# Patient Record
Sex: MALE | Race: WHITE | NOT HISPANIC OR LATINO | URBAN - METROPOLITAN AREA
[De-identification: names, ages, dates, MRNs, and addresses within clinical notes are randomized per-mention and may not be internally consistent; named-entity substitution may affect disease eponyms.]

---

## 2019-01-28 ENCOUNTER — OFFICE VISIT (OUTPATIENT)
Dept: DERMATOLOGY | Age: 59
End: 2019-01-28

## 2019-01-28 DIAGNOSIS — D48.5 NEOPLASM OF UNCERTAIN BEHAVIOR OF SKIN: Primary | ICD-10-CM

## 2019-01-28 DIAGNOSIS — Z12.83 SCREENING EXAM FOR SKIN CANCER: ICD-10-CM

## 2019-01-28 PROCEDURE — 11301 SHAVE SKIN LESION 0.6-1.0 CM: CPT | Performed by: DERMATOLOGY

## 2019-01-28 PROCEDURE — 99203 OFFICE O/P NEW LOW 30 MIN: CPT | Performed by: DERMATOLOGY

## 2019-01-28 RX ORDER — ATENOLOL AND CHLORTHALIDONE TABLET 50; 25 MG/1; MG/1
1 TABLET ORAL
COMMUNITY
Start: 2018-07-06

## 2019-01-28 RX ORDER — OMEPRAZOLE 20 MG/1
20 CAPSULE, DELAYED RELEASE ORAL
COMMUNITY

## 2019-01-28 RX ORDER — TESTOSTERONE CYPIONATE 200 MG/ML
INJECTION, SOLUTION INTRAMUSCULAR
Refills: 3 | COMMUNITY
Start: 2019-01-13

## 2019-01-28 RX ORDER — CHLORTHALIDONE 25 MG/1
25 TABLET ORAL
COMMUNITY

## 2019-01-28 RX ORDER — LEVOTHYROXINE SODIUM 0.2 MG/1
200 TABLET ORAL
COMMUNITY
Start: 2016-08-08

## 2019-01-28 RX ORDER — LISINOPRIL 40 MG/1
40 TABLET ORAL
COMMUNITY

## 2019-01-28 RX ORDER — ROSUVASTATIN CALCIUM 10 MG/1
10 TABLET, COATED ORAL
COMMUNITY

## 2019-01-28 RX ORDER — GABAPENTIN 600 MG/1
600 TABLET ORAL
COMMUNITY

## 2019-01-28 RX ORDER — FLASH GLUCOSE SENSOR
1 KIT MISCELLANEOUS
COMMUNITY
Start: 2018-07-10

## 2019-01-28 RX ORDER — LISINOPRIL 10 MG/1
10 TABLET ORAL
COMMUNITY

## 2019-01-28 RX ORDER — IBUPROFEN 800 MG/1
800 TABLET ORAL
COMMUNITY

## 2019-01-28 RX ORDER — CETIRIZINE HYDROCHLORIDE 10 MG/1
TABLET ORAL
COMMUNITY

## 2019-01-28 RX ORDER — TESTOSTERONE CYPIONATE 200 MG/ML
INJECTION, SOLUTION INTRAMUSCULAR
COMMUNITY
Start: 2018-06-18

## 2019-01-28 RX ORDER — DULOXETIN HYDROCHLORIDE 60 MG/1
1 CAPSULE, DELAYED RELEASE ORAL
COMMUNITY
Start: 2018-07-06

## 2019-01-28 RX ORDER — ATORVASTATIN CALCIUM 20 MG/1
TABLET, FILM COATED ORAL
COMMUNITY
Start: 2018-11-13

## 2019-01-30 LAB — PATH REPORT PLASRBC-IMP: NORMAL

## 2019-03-07 ENCOUNTER — IMAGING SERVICES (OUTPATIENT)
Dept: GENERAL RADIOLOGY | Age: 59
End: 2019-03-07
Attending: FAMILY MEDICINE

## 2019-03-07 ENCOUNTER — WALK IN (OUTPATIENT)
Dept: URGENT CARE | Age: 59
End: 2019-03-07

## 2019-03-07 VITALS
TEMPERATURE: 97.7 F | DIASTOLIC BLOOD PRESSURE: 90 MMHG | OXYGEN SATURATION: 98 % | RESPIRATION RATE: 16 BRPM | HEART RATE: 76 BPM | SYSTOLIC BLOOD PRESSURE: 144 MMHG

## 2019-03-07 DIAGNOSIS — R05.9 COUGH: Primary | ICD-10-CM

## 2019-03-07 DIAGNOSIS — R05.9 COUGH: ICD-10-CM

## 2019-03-07 PROCEDURE — 99204 OFFICE O/P NEW MOD 45 MIN: CPT | Performed by: FAMILY MEDICINE

## 2019-03-07 PROCEDURE — 71046 X-RAY EXAM CHEST 2 VIEWS: CPT | Performed by: RADIOLOGY

## 2019-03-07 RX ORDER — ALBUTEROL SULFATE 90 UG/1
AEROSOL, METERED RESPIRATORY (INHALATION)
Qty: 1 INHALER | Refills: 0 | Status: SHIPPED | OUTPATIENT
Start: 2019-03-07

## 2019-03-07 RX ORDER — PREDNISONE 20 MG/1
40 TABLET ORAL DAILY
Qty: 10 TABLET | Refills: 0 | Status: SHIPPED | OUTPATIENT
Start: 2019-03-07 | End: 2019-03-12

## 2019-03-07 RX ORDER — AZITHROMYCIN 250 MG/1
TABLET, FILM COATED ORAL
Qty: 6 TABLET | Refills: 0 | Status: SHIPPED | OUTPATIENT
Start: 2019-03-07 | End: 2019-03-12

## 2019-03-07 RX ORDER — CODEINE PHOSPHATE AND GUAIFENESIN 10; 100 MG/5ML; MG/5ML
5 SOLUTION ORAL 4 TIMES DAILY PRN
Qty: 120 ML | Refills: 0 | Status: SHIPPED | OUTPATIENT
Start: 2019-03-07 | End: 2019-03-17

## 2024-12-07 ENCOUNTER — APPOINTMENT (OUTPATIENT)
Dept: CT IMAGING | Facility: HOSPITAL | Age: 64
End: 2024-12-07
Attending: EMERGENCY MEDICINE
Payer: MEDICARE

## 2024-12-07 ENCOUNTER — APPOINTMENT (OUTPATIENT)
Dept: GENERAL RADIOLOGY | Facility: HOSPITAL | Age: 64
End: 2024-12-07
Attending: EMERGENCY MEDICINE
Payer: MEDICARE

## 2024-12-07 ENCOUNTER — HOSPITAL ENCOUNTER (INPATIENT)
Facility: HOSPITAL | Age: 64
LOS: 3 days | Discharge: HOME | End: 2024-12-10
Attending: EMERGENCY MEDICINE | Admitting: HOSPITALIST
Payer: MEDICARE

## 2024-12-07 DIAGNOSIS — E87.6 HYPOKALEMIA: ICD-10-CM

## 2024-12-07 DIAGNOSIS — K56.609 SBO (SMALL BOWEL OBSTRUCTION) (HCC): Primary | ICD-10-CM

## 2024-12-07 PROBLEM — G63 POLYNEUROPATHY ASSOCIATED WITH UNDERLYING DISEASE (HCC): Chronic | Status: ACTIVE | Noted: 2024-12-07

## 2024-12-07 PROBLEM — E11.9 TYPE 2 DIABETES MELLITUS WITHOUT COMPLICATION, WITHOUT LONG-TERM CURRENT USE OF INSULIN (HCC): Chronic | Status: ACTIVE | Noted: 2024-12-07

## 2024-12-07 PROBLEM — I10 PRIMARY HYPERTENSION: Chronic | Status: ACTIVE | Noted: 2024-12-07

## 2024-12-07 PROBLEM — E03.9 ACQUIRED HYPOTHYROIDISM: Chronic | Status: ACTIVE | Noted: 2024-12-07

## 2024-12-07 LAB
ALBUMIN SERPL-MCNC: 4.8 G/DL (ref 3.2–4.8)
ALBUMIN/GLOB SERPL: 1.5 {RATIO} (ref 1–2)
ALP LIVER SERPL-CCNC: 93 U/L
ALT SERPL-CCNC: 28 U/L
ANION GAP SERPL CALC-SCNC: 13 MMOL/L (ref 0–18)
AST SERPL-CCNC: 26 U/L (ref ?–34)
ATRIAL RATE: 87 BPM
BASOPHILS # BLD AUTO: 0.06 X10(3) UL (ref 0–0.2)
BASOPHILS NFR BLD AUTO: 0.4 %
BILIRUB SERPL-MCNC: 1.4 MG/DL (ref 0.3–1.2)
BUN BLD-MCNC: 15 MG/DL (ref 9–23)
CALCIUM BLD-MCNC: 11.3 MG/DL (ref 8.7–10.4)
CHLORIDE SERPL-SCNC: 98 MMOL/L (ref 98–112)
CO2 SERPL-SCNC: 27 MMOL/L (ref 21–32)
CREAT BLD-MCNC: 0.97 MG/DL
EGFRCR SERPLBLD CKD-EPI 2021: 90 ML/MIN/1.73M2 (ref 60–?)
EOSINOPHIL # BLD AUTO: 0.13 X10(3) UL (ref 0–0.7)
EOSINOPHIL NFR BLD AUTO: 0.8 %
ERYTHROCYTE [DISTWIDTH] IN BLOOD BY AUTOMATED COUNT: 19.7 %
GLOBULIN PLAS-MCNC: 3.1 G/DL (ref 2–3.5)
GLUCOSE BLD-MCNC: 122 MG/DL (ref 70–99)
GLUCOSE BLD-MCNC: 122 MG/DL (ref 70–99)
GLUCOSE BLD-MCNC: 126 MG/DL (ref 70–99)
HCT VFR BLD AUTO: 51.9 %
HGB BLD-MCNC: 17.2 G/DL
IMM GRANULOCYTES # BLD AUTO: 0.07 X10(3) UL (ref 0–1)
IMM GRANULOCYTES NFR BLD: 0.4 %
LACTATE SERPL-SCNC: 0.9 MMOL/L (ref 0.5–2)
LIPASE SERPL-CCNC: 66 U/L (ref 12–53)
LYMPHOCYTES # BLD AUTO: 2.21 X10(3) UL (ref 1–4)
LYMPHOCYTES NFR BLD AUTO: 13 %
MCH RBC QN AUTO: 25.1 PG (ref 26–34)
MCHC RBC AUTO-ENTMCNC: 33.1 G/DL (ref 31–37)
MCV RBC AUTO: 75.9 FL
MONOCYTES # BLD AUTO: 0.9 X10(3) UL (ref 0.1–1)
MONOCYTES NFR BLD AUTO: 5.3 %
NEUTROPHILS # BLD AUTO: 13.67 X10 (3) UL (ref 1.5–7.7)
NEUTROPHILS # BLD AUTO: 13.67 X10(3) UL (ref 1.5–7.7)
NEUTROPHILS NFR BLD AUTO: 80.1 %
OSMOLALITY SERPL CALC.SUM OF ELEC: 288 MOSM/KG (ref 275–295)
P AXIS: 15 DEGREES
P-R INTERVAL: 144 MS
PLATELET # BLD AUTO: 258 10(3)UL (ref 150–450)
POTASSIUM SERPL-SCNC: 3 MMOL/L (ref 3.5–5.1)
PROT SERPL-MCNC: 7.9 G/DL (ref 5.7–8.2)
Q-T INTERVAL: 392 MS
QRS DURATION: 108 MS
QTC CALCULATION (BEZET): 471 MS
R AXIS: 26 DEGREES
RBC # BLD AUTO: 6.84 X10(6)UL
SODIUM SERPL-SCNC: 138 MMOL/L (ref 136–145)
T AXIS: 38 DEGREES
TROPONIN I SERPL HS-MCNC: 7 NG/L
VENTRICULAR RATE: 87 BPM
WBC # BLD AUTO: 17 X10(3) UL (ref 4–11)

## 2024-12-07 PROCEDURE — 99223 1ST HOSP IP/OBS HIGH 75: CPT | Performed by: SURGERY

## 2024-12-07 PROCEDURE — 71045 X-RAY EXAM CHEST 1 VIEW: CPT | Performed by: EMERGENCY MEDICINE

## 2024-12-07 PROCEDURE — 74177 CT ABD & PELVIS W/CONTRAST: CPT | Performed by: EMERGENCY MEDICINE

## 2024-12-07 PROCEDURE — 70450 CT HEAD/BRAIN W/O DYE: CPT | Performed by: EMERGENCY MEDICINE

## 2024-12-07 RX ORDER — LIDOCAINE HYDROCHLORIDE 20 MG/ML
JELLY TOPICAL
Status: DISPENSED
Start: 2024-12-07 | End: 2024-12-07

## 2024-12-07 RX ORDER — LISINOPRIL 40 MG/1
1 TABLET ORAL DAILY
COMMUNITY

## 2024-12-07 RX ORDER — BUPROPION HYDROCHLORIDE 200 MG/1
200 TABLET, EXTENDED RELEASE ORAL DAILY
COMMUNITY
End: 2024-12-10

## 2024-12-07 RX ORDER — EMPAGLIFLOZIN 25 MG/1
1 TABLET, FILM COATED ORAL DAILY
COMMUNITY

## 2024-12-07 RX ORDER — ONDANSETRON 2 MG/ML
4 INJECTION INTRAMUSCULAR; INTRAVENOUS EVERY 6 HOURS PRN
Status: DISCONTINUED | OUTPATIENT
Start: 2024-12-07 | End: 2024-12-10

## 2024-12-07 RX ORDER — MIRABEGRON 50 MG/1
1 TABLET, FILM COATED, EXTENDED RELEASE ORAL DAILY
COMMUNITY

## 2024-12-07 RX ORDER — DULOXETIN HYDROCHLORIDE 60 MG/1
60 CAPSULE, DELAYED RELEASE ORAL 2 TIMES DAILY
COMMUNITY

## 2024-12-07 RX ORDER — ATORVASTATIN CALCIUM 40 MG/1
40 TABLET, FILM COATED ORAL DAILY
COMMUNITY

## 2024-12-07 RX ORDER — LEVOTHYROXINE SODIUM 200 UG/1
1 TABLET ORAL DAILY
COMMUNITY

## 2024-12-07 RX ORDER — SENNOSIDES 8.6 MG
17.2 TABLET ORAL NIGHTLY PRN
Status: DISCONTINUED | OUTPATIENT
Start: 2024-12-07 | End: 2024-12-10

## 2024-12-07 RX ORDER — METOCLOPRAMIDE HYDROCHLORIDE 5 MG/ML
10 INJECTION INTRAMUSCULAR; INTRAVENOUS EVERY 8 HOURS PRN
Status: DISCONTINUED | OUTPATIENT
Start: 2024-12-07 | End: 2024-12-10

## 2024-12-07 RX ORDER — ACETAMINOPHEN 500 MG
500 TABLET ORAL EVERY 4 HOURS PRN
Status: DISCONTINUED | OUTPATIENT
Start: 2024-12-07 | End: 2024-12-10

## 2024-12-07 RX ORDER — TRAZODONE HYDROCHLORIDE 50 MG/1
0.5 TABLET, FILM COATED ORAL NIGHTLY PRN
COMMUNITY

## 2024-12-07 RX ORDER — BUSPIRONE HYDROCHLORIDE 5 MG/1
5 TABLET ORAL 2 TIMES DAILY
COMMUNITY
Start: 2024-06-14

## 2024-12-07 RX ORDER — ONDANSETRON 2 MG/ML
4 INJECTION INTRAMUSCULAR; INTRAVENOUS ONCE
Status: COMPLETED | OUTPATIENT
Start: 2024-12-07 | End: 2024-12-07

## 2024-12-07 RX ORDER — MORPHINE SULFATE 4 MG/ML
4 INJECTION, SOLUTION INTRAMUSCULAR; INTRAVENOUS EVERY 30 MIN PRN
Status: DISCONTINUED | OUTPATIENT
Start: 2024-12-07 | End: 2024-12-07

## 2024-12-07 RX ORDER — LIDOCAINE HYDROCHLORIDE 20 MG/ML
10 JELLY TOPICAL ONCE
Status: DISCONTINUED | OUTPATIENT
Start: 2024-12-07 | End: 2024-12-07

## 2024-12-07 RX ORDER — POLYETHYLENE GLYCOL 3350 17 G/17G
17 POWDER, FOR SOLUTION ORAL DAILY PRN
Status: DISCONTINUED | OUTPATIENT
Start: 2024-12-07 | End: 2024-12-10

## 2024-12-07 RX ORDER — ENOXAPARIN SODIUM 100 MG/ML
40 INJECTION SUBCUTANEOUS DAILY
Status: DISCONTINUED | OUTPATIENT
Start: 2024-12-07 | End: 2024-12-10

## 2024-12-07 RX ORDER — MORPHINE SULFATE 4 MG/ML
4 INJECTION, SOLUTION INTRAMUSCULAR; INTRAVENOUS EVERY 2 HOUR PRN
Status: DISCONTINUED | OUTPATIENT
Start: 2024-12-07 | End: 2024-12-10

## 2024-12-07 RX ORDER — MORPHINE SULFATE 2 MG/ML
1 INJECTION, SOLUTION INTRAMUSCULAR; INTRAVENOUS EVERY 2 HOUR PRN
Status: DISCONTINUED | OUTPATIENT
Start: 2024-12-07 | End: 2024-12-10

## 2024-12-07 RX ORDER — SODIUM PHOSPHATE, DIBASIC AND SODIUM PHOSPHATE, MONOBASIC 7; 19 G/230ML; G/230ML
1 ENEMA RECTAL ONCE AS NEEDED
Status: DISCONTINUED | OUTPATIENT
Start: 2024-12-07 | End: 2024-12-10

## 2024-12-07 RX ORDER — POLYETHYLENE GLYCOL 3350 17 G/17G
17 POWDER, FOR SOLUTION ORAL DAILY
Status: DISCONTINUED | OUTPATIENT
Start: 2024-12-07 | End: 2024-12-10

## 2024-12-07 RX ORDER — BISACODYL 10 MG
10 SUPPOSITORY, RECTAL RECTAL
Status: DISCONTINUED | OUTPATIENT
Start: 2024-12-07 | End: 2024-12-10

## 2024-12-07 RX ORDER — TIRZEPATIDE 2.5 MG/.5ML
2.5 INJECTION, SOLUTION SUBCUTANEOUS
COMMUNITY

## 2024-12-07 RX ORDER — OMEPRAZOLE 40 MG/1
1 CAPSULE, DELAYED RELEASE ORAL DAILY
COMMUNITY

## 2024-12-07 RX ORDER — MORPHINE SULFATE 2 MG/ML
2 INJECTION, SOLUTION INTRAMUSCULAR; INTRAVENOUS EVERY 2 HOUR PRN
Status: DISCONTINUED | OUTPATIENT
Start: 2024-12-07 | End: 2024-12-10

## 2024-12-07 RX ORDER — LIDOCAINE HYDROCHLORIDE 20 MG/ML
10 JELLY TOPICAL ONCE
Status: COMPLETED | OUTPATIENT
Start: 2024-12-07 | End: 2024-12-07

## 2024-12-07 RX ORDER — ATENOLOL AND CHLORTHALIDONE TABLET 100; 25 MG/1; MG/1
1 TABLET ORAL DAILY
COMMUNITY

## 2024-12-07 RX ORDER — GABAPENTIN 600 MG/1
1 TABLET ORAL 2 TIMES DAILY
COMMUNITY

## 2024-12-07 RX ORDER — BISACODYL 10 MG
10 SUPPOSITORY, RECTAL RECTAL ONCE
Status: COMPLETED | OUTPATIENT
Start: 2024-12-07 | End: 2024-12-07

## 2024-12-07 RX ORDER — LIDOCAINE HYDROCHLORIDE 20 MG/ML
10 JELLY TOPICAL ONCE
Status: CANCELLED | OUTPATIENT
Start: 2024-12-07 | End: 2024-12-07

## 2024-12-07 RX ORDER — BUPROPION HYDROCHLORIDE 100 MG/1
100 TABLET, EXTENDED RELEASE ORAL DAILY
COMMUNITY

## 2024-12-07 RX ORDER — SODIUM CHLORIDE 9 MG/ML
INJECTION, SOLUTION INTRAVENOUS CONTINUOUS
Status: DISCONTINUED | OUTPATIENT
Start: 2024-12-07 | End: 2024-12-08

## 2024-12-07 NOTE — PLAN OF CARE
Patient admitted for SBO.  NG tube secured at 60 cm to LIS.  Medications given as ordered.  Patient voiding without difficulty.  Plan for continued NG tube with possible clamping trial tomorrow, repeat abdominal x-ray.

## 2024-12-07 NOTE — PLAN OF CARE
NURSING ADMISSION NOTE      Patient admitted via  bed  Oriented to room.  Safety precautions initiated.  Bed in low position.  Call light in reach.  Alert and Oriented x4. On RA. VSS. Tele-NS. NG to [facility], LB 12/7 upon admission, see flowsheets for more information. Denies pain at this time. Denies N/T. Voiding freely. NPO. Denies N/V. Call light within reach at this time.  2-Person Skin check done with CONNER Richey. Left Heel diabetic ulcer open to air, picture in flowsheets. Otherwise skin WNL.     Plan: NPO, GenSurg to see

## 2024-12-07 NOTE — CONSULTS
[facility]  Report of Consultation    Jd Chaves Patient Status:  Inpatient    1960 MRN [id number]   Location [facility] 3SW-A Attending Maki Matt MD   Hosp Day # 0 PCP RADHA REYES MD     Requesting Physician:  Dr. Saqib Machado    Reason for Consultation:  Small bowel obstruction    I have seen and evaluated the patient in conjunction with my physician assistant.    I reviewed all data and imaging from this admission.  I concur with the radiologist interpretation except as noted.    I have modified this document to reflect my data interpretation, my physical exam findings, and my care plan.    Chief Complaint:  Abdominal pain    History of Present Illness:  Jd Chaves is a 64 year old male who presents to [facility] on 2024 with complaints of abdominal pain.  The patient states that on the evening prior to his admission he developed generalized abdominal pain.  He reported associated abdominal bloating and distention.  He also reported nausea and vomiting.  He states that the pain was sudden onset.  He reports his last bowel movement was on the day prior to his admission.  He reports minimal flatus.  He denies difficulty urinating.  He he denies fever or chills.  He denies a history of small bowel obstructions.  Patient states that he presented to the emergency department due to the increased abdominal pain.    Upon presentation to the hospital, the patient was afebrile and dynamically stable.  WBC 17.0 hemoglobin 17.2 platelets 258,000.  BUN 15 creatinine 0.97 AST 26 ALT 28 total bilirubin 1.4 lipase 66.Acute cholecystitis the patient was afebrile and hemodynamically stable.  CT scan of the abdomen and pelvis with findings of numerous distended loops of small bowel with air-fluid levels and stasis of contents, some of which measure up to 4.3 cm with no definite transition point, favored to represent adynamic ileus.  No free air or free fluid noted.    Past  abdominal surgical history includes colon resection with Hatch's procedure for perforated diverticulitis and colostomy reversal.        History:  Past Medical History:    Bowel obstruction (HCC)    Diabetes (HCC)    Essential hypertension    Thyroid disease     History reviewed. No pertinent surgical history.  No family history on file.       Allergies:  Allergies[1]    Medications:  Medications Prior to Admission   Medication Sig    AZELASTINE & FLUTICASONE NA 2 sprays by Nasal route 2 (two) times daily.    DULoxetine 60 MG Oral Cap DR Particles Take 1 capsule (60 mg total) by mouth 2 (two) times daily.    gabapentin 600 MG Oral Tab Take 1 tablet (600 mg total) by mouth 2 (two) times daily.    Testosterone Cypionate 200 MG/ML Intramuscular Kit Inject 1 mL into the muscle every 14 (fourteen) days.    MYRBETRIQ 50 MG Oral Tablet 24 Hr Take 1 tablet (50 mg total) by mouth daily.    atenolol-chlorthalidone 100-25 MG Oral Tab Take 1 tablet by mouth daily.    atorvastatin 40 MG Oral Tab Take 1 tablet (40 mg total) by mouth daily.    buPROPion  MG Oral Tablet 12 Hr Take 1 tablet (100 mg total) by mouth daily.    buPROPion HCl ER, SR, 200 MG Oral Tablet 12 Hr Take 1 tablet (200 mg total) by mouth daily.    busPIRone 5 MG Oral Tab Take 1 tablet (5 mg total) by mouth 2 (two) times daily.    lisinopril 40 MG Oral Tab Take 1 tablet (40 mg total) by mouth daily.    levothyroxine 200 MCG Oral Tab Place 1 Ring vaginally every 28 days.    metFORMIN HCl 1000 MG Oral Tab Take 1 tablet (1,000 mg total) by mouth 2 (two) times daily.    Omeprazole 40 MG Oral Capsule Delayed Release Take 1 capsule (40 mg total) by mouth daily.    JARDIANCE 25 MG Oral Tab Take 1 tablet (25 mg total) by mouth daily.    traZODone 50 MG Oral Tab Take 0.5 tablets (25 mg total) by mouth nightly as needed.    Tirzepatide (MOUNJARO) 2.5 MG/0.5ML Subcutaneous Solution Auto-injector Inject 2.5 mg as directed every 7 days.         Current  Facility-Administered Medications:     melatonin tab 3 mg, 3 mg, Oral, Nightly PRN    ondansetron (Zofran) 4 MG/2ML injection 4 mg, 4 mg, Intravenous, Q6H PRN    metoclopramide (Reglan) 5 mg/mL injection 10 mg, 10 mg, Intravenous, Q8H PRN    sodium chloride 0.9% infusion, , Intravenous, Continuous    enoxaparin (Lovenox) 40 MG/0.4ML SUBQ injection 40 mg, 40 mg, Subcutaneous, Daily    acetaminophen (Tylenol Extra Strength) tab 500 mg, 500 mg, Oral, Q4H PRN    polyethylene glycol (PEG 3350) (Miralax) 17 g oral packet 17 g, 17 g, Oral, Daily PRN    sennosides (Senokot) tab 17.2 mg, 17.2 mg, Oral, Nightly PRN    bisacodyl (Dulcolax) 10 MG rectal suppository 10 mg, 10 mg, Rectal, Daily PRN    fleet enema (Fleet) rectal enema 133 mL, 1 enema, Rectal, Once PRN    morphINE PF 2 MG/ML injection 1 mg, 1 mg, Intravenous, Q2H PRN **OR** morphINE PF 2 MG/ML injection 2 mg, 2 mg, Intravenous, Q2H PRN **OR** morphINE PF 4 MG/ML injection 4 mg, 4 mg, Intravenous, Q2H PRN    phenol (Chloraseptic) 1.4 % oral liquid spray, , Oral, Q2H PRN    pantoprazole (Protonix) 40 mg in sodium chloride 0.9% PF 10 mL IV push, 40 mg, Intravenous, Q12H    bisacodyl (Dulcolax) 10 MG rectal suppository 10 mg, 10 mg, Rectal, Once    polyethylene glycol (PEG 3350) (Miralax) 17 g oral packet 17 g, 17 g, Oral, Daily      Physical Exam:  /74 (BP Location: Right arm)   Pulse 82   Temp 98.1 °F (36.7 °C) (Oral)   Resp 18   Ht 74\"   Wt 270 lb (122.5 kg)   SpO2 97%   BMI 34.67 kg/m²   Physical Exam  Constitutional:       General: He is not in acute distress.     Appearance: Normal appearance. He is not ill-appearing.   HENT:      Head: Normocephalic and atraumatic.      Mouth/Throat:      Mouth: Mucous membranes are moist.      Pharynx: Oropharynx is clear.   Eyes:      Conjunctiva/sclera: Conjunctivae normal.   Cardiovascular:      Rate and Rhythm: Normal rate and regular rhythm.      Pulses: Normal pulses.   Pulmonary:      Effort: Pulmonary  effort is normal. No respiratory distress.   Abdominal:      General: There is distension.      Palpations: Abdomen is soft.      Tenderness: There is abdominal tenderness. There is no guarding or rebound.   Musculoskeletal:         General: Normal range of motion.      Cervical back: Normal range of motion.   Skin:     General: Skin is warm and dry.   Neurological:      General: No focal deficit present.      Mental Status: He is alert and oriented to person, place, and time.   Psychiatric:         Mood and Affect: Mood normal.         Behavior: Behavior normal.         Laboratory Data:  Recent Labs   Lab 12/07/24  0013   RBC 6.84*   HGB 17.2   HCT 51.9   MCV 75.9*   MCH 25.1*   MCHC 33.1   RDW 19.7   NEPRELIM 13.67*   WBC 17.0*   .0       Recent Labs   Lab 12/07/24  0013   *   BUN 15   CREATSERUM 0.97   CA 11.3*   ALB 4.8      K 3.0*   CL 98   CO2 27.0   ALKPHO 93   AST 26   ALT 28   BILT 1.4*   TP 7.9         No results for input(s): \"PTP\", \"INR\", \"PTT\" in the last 168 hours.      XR CHEST AP PORTABLE  (CPT=71045)    Result Date: 12/7/2024  CONCLUSION:  NG tube in the stomach.   LOCATION:  [facility]      Dictated by (CST): Andrés Mcallister MD on 12/07/2024 at 7:36 AM     Finalized by (CST): Andrés Mcallister MD on 12/07/2024 at 7:37 AM       XR CHEST AP PORTABLE  (CPT=71045)    Result Date: 12/7/2024  CONCLUSION:  NG tube with tip in the proximal stomach with side port in the distal esophagus.   LOCATION:  [facility]      Dictated by (CST): Andrés Mcallister MD on 12/07/2024 at 7:13 AM     Finalized by (CST): Andrés Mcallister MD on 12/07/2024 at 7:14 AM          Soniya Mclain PA-C  12/7/2024  1:02 PM      Medical Decision Making         Impression:  Patient Active Problem List   Diagnosis    SBO (small bowel obstruction) ([facility])    Hypokalemia    Primary hypertension    Acquired hypothyroidism    Type 2 diabetes mellitus without complication, without long-term current use of insulin (HCC)    Polyneuropathy  associated with underlying disease (HCC)       Partial small bowel obstruction.    No acute surgical invention required as no evidence of strangulation on exam.    Bowel rest, IV fluid hydration, NG tube decompression until bowel function resumes.    Ambulate as tolerated.    GI and DVT prophylaxis.    Patient fails to improve or clinically declines surgical intervention may be required this hospitalization.    Once bowel function resumes Delle would be advanced as tolerated.    Care plan discussed all questions answered.    Further recommendations as the clinical course evolves      Stas Freeman MD [facility]  Trauma Medical Director, [facility] General Surgery    The 21st Century Cures Act makes medical notes like these available to patients in the interest of transparency. Please be advised this is a medical document. Medical documents are intended to carry relevant information, facts as evident, and the clinical opinion of the practitioner. The medical note is intended as peer to peer communication and may appear blunt or direct. It is written in medical language and may contain abbreviations or verbiage that are unfamiliar.    This note was prepared using Dragon Medical voice recognition dictation software. As a result, errors may occur. When identified, these errors have been corrected. While every attempt is made to correct errors during dictation, discrepancies may still exist.                                     [1] No Known Allergies

## 2024-12-07 NOTE — ED QUICK NOTES
Orders for admission, patient is aware of plan and ready to go upstairs. Any questions, please call ED RN Elma at extension [phone].     Patient Covid vaccination status: Unvaccinated     COVID Test Ordered in ED: None    COVID Suspicion at Admission: N/A    Running Infusions:  K rider    Mental Status/LOC at time of transport: A&O x 4    Other pertinent information: Pt NG tube hooked up to intermittent suction. All consults aware.    CIWA score: N/A   NIH score:  N/A

## 2024-12-07 NOTE — ED PROVIDER NOTES
Patient Seen in: [facility] Emergency Department      History     Chief Complaint   Patient presents with    Abdomen/Flank Pain     Pt co abdominal pain with nausea, history of Bowel obstruction, fentanyl and zofran given by ems with no relief     Stated Complaint:     Subjective:   HPI      Patient is a 64-year-old male presents to ED for evaluation of abdominal pain, nausea.  Patient symptoms started a couple hours ago.  Complains of a diffuse abdominal pain which is nonradiating.  History of bowel obstruction requiring bowel resection 10 years ago at outside facility.  Denies fever.  Patient complains of a mild headache.  Denies chest pain or shortness of breath.  Last bowel movement 3 days ago.    Objective:     Past Medical History:    Bowel obstruction (HCC)    Diabetes (HCC)    Essential hypertension    Thyroid disease              History reviewed. No pertinent surgical history.             Social History     Socioeconomic History    Marital status:                   Physical Exam     ED Triage Vitals [12/07/24 0006]   BP (!) 182/112   Pulse 89   Resp 16   Temp 98.9 °F (37.2 °C)   Temp src Temporal   SpO2 100 %   O2 Device None (Room air)       Current Vitals:   Vital Signs  BP: (!) 147/94  Pulse: 84  Resp: 13  Temp: 98.9 °F (37.2 °C)  Temp src: Temporal  MAP (mmHg): (!) 109    Oxygen Therapy  SpO2: 98 %  O2 Device: None (Room air)        Physical Exam  GENERAL: Patient appears moderately uncomfortable due to pain.  Alert and oriented X 3   HEENT: Normocephalic, atraumatic.  Moist mucous membranes.  Pupils equal round reactive to light and accommodation, extraocular motion is intact, sclerae white, conjunctiva is pink.  Oropharynx is unremarkable, no exudate.  NECK: Supple, trachea midline, no lymphadenopathy.   LUNG: Lungs clear to auscultation bilaterally, no wheezing, no rales, no rhonchi.  CARDIOVASCULAR: Regular rate and rhythm.  Normal S1S2.  No S3S4 or murmur.  ABDOMEN: Bowel sounds are  present and hyperactive.  He has a distended abdomen with moderate diffuse tenderness noted  MUSCULOSKELETAL: No calf tenderness.  Dorsalis and Posterior Tibial pulses present. No clubbing. No cyanosis.  No edema.   SKIN EXAMINATIoN: Warm and dry with normal appearance.  No rashes or lesions.  NEUROLOGICAL:  Motor strength intact all groups.  normal sensation, speech intact    ED Course     Labs Reviewed   COMP METABOLIC PANEL (14) - Abnormal; Notable for the following components:       Result Value    Glucose 122 (*)     Potassium 3.0 (*)     Calcium, Total 11.3 (*)     Bilirubin, Total 1.4 (*)     All other components within normal limits   LIPASE - Abnormal; Notable for the following components:    Lipase 66 (*)     All other components within normal limits   CBC WITH DIFFERENTIAL WITH PLATELET - Abnormal; Notable for the following components:    WBC 17.0 (*)     RBC 6.84 (*)     MCV 75.9 (*)     MCH 25.1 (*)     Neutrophil Absolute Prelim 13.67 (*)     Neutrophil Absolute 13.67 (*)     All other components within normal limits   POCT GLUCOSE - Abnormal; Notable for the following components:    POC Glucose 122 (*)     All other components within normal limits   TROPONIN I HIGH SENSITIVITY - Normal   RAINBOW DRAW LAVENDER   RAINBOW DRAW LIGHT GREEN   RAINBOW DRAW BLUE   Potassium 3.0.  Lipase 66.  White blood cell count 17.0  EKG    Rate, intervals and axes as noted on EKG Report.  Rate: 87  Rhythm: Sinus Rhythm  Reading: No acute changes                I personally reviewd CT images of abdomen and pelvis and independent interpretation shows dilated small bowel loops with dilated  stomach.  I also viewed formal radiology report as read by radiology with findings below:    CT of abdomen and pelvis read by vision rad radiology shows numerous distended loops of small bowel with air-fluid levels and stasis of contents measuring up to 4.3 cm without definitive transition point.  Could be ileus or early/partial small  bowel obstruction.  CT head shows no acute process    I personally reviewed xray films of chest x 2 and independent interpretation shows initial x-ray shows NG tube which appears to be within the tip of the stomach.  It was advanced and repeat x-ray obtained showing better NG tube placement.  I also reviewed formal xray report as read by radiology with findings below:    Xray of chest read by [facility] rad radiology shows NG tube within the tip of the stomach.  Second x-ray shows better placement with NG tube in the stomach    Medications   morphINE PF 4 MG/ML injection 4 mg (4 mg Intravenous Given 12/7/24 0233)   potassium chloride 40 mEq in 250mL sodium chloride 0.9% IVPB premix (40 mEq Intravenous New Bag 12/7/24 0151)   sodium chloride 0.9 % IV bolus 1,000 mL (0 mL Intravenous Stopped 12/7/24 0154)   ondansetron (Zofran) 4 MG/2ML injection 4 mg (4 mg Intravenous Given 12/7/24 0028)   iopamidol 76% (ISOVUE-370) injection for power injector (100 mL Intravenous Given 12/7/24 0048)   ondansetron (Zofran) 4 MG/2ML injection 4 mg (4 mg Intravenous Given 12/7/24 0233)   lidocaine (Urojet) 2 % urethral jelly 10 mL (10 mL Urethral Given 12/7/24 0224)          MDM      Patient is a 64-year-old male presents to ED for evaluation of abdominal pain, nausea, headache.  Differential bowel obstruction, pancreatitis, hepatitis, abdominal aneurysm.  Patient laboratory test performed showing leukocytosis, slightly elevated white blood cell count and potassium of 3.0.  Given IV potassium rider.  CT abdomen pelvis shows distended bowel loops with distended stomach consistent with early partial small bowel obstruction.  Favor partial small bowel obstruction.  NG tube inserted.  Case discussed with hospitalist as well as general surgery.  Patient will be admitted to the hospital.  Workup and results were discussed with patient. Patient has no other questions, complaints or concerns. Patient will be admitted to the hospital for further  workup.    Admission disposition: 12/7/2024  1:33 AM           Medical Decision Making      Disposition and Plan     Clinical Impression:  1. SBO (small bowel obstruction) (HCC)    2. Hypokalemia    3.  Leukocytosis    Disposition:  Admit  12/7/2024  1:33 am    Follow-up:  No follow-up provider specified.        Medications Prescribed:  There are no discharge medications for this patient.          Supplementary Documentation:         Hospital Problems       Present on Admission             ICD-10-CM Noted POA    * (Principal) SBO (small bowel obstruction) (HCC) K56.609 12/7/2024 Unknown

## 2024-12-07 NOTE — H&P
[facility]     Jd Chaves Patient Status:  Emergency    1960 MRN [id number]   Location [facility] EMERGENCY DEPARTMENT Attending Saqib Machado MD   Hosp Day # 0 PCP RADHA REYES MD     Chief Complaint: Abdominal pain    Subjective:    History of Present Illness:     Jd Chaves is a 64 year old male with history of hypertension, diabetes presents the emergency room with abdominal pain that started a few hours before coming to the emergency room.  Abdominal pain is diffuse throughout the abdomen but nonradiating.  Patient had some nausea but no vomiting.  Patient was bowel movement was 2 days ago which was normal.  No hematochezia, melena, hematuria.  No chest pain, shortness of breath    History/Other:    Past Medical History:  Past Medical History:    Bowel obstruction (HCC)    Diabetes (HCC)    Essential hypertension    Thyroid disease     Past Surgical History:   History reviewed. No pertinent surgical history.   Family History:   No family history on file.  Social History:         Allergies: Allergies[1]    Medications:  Medications Ordered Prior to Encounter[2]    Review of Systems:   A comprehensive review of systems was completed.    Pertinent positives and negatives noted in the HPI.    Objective:   Physical Exam:    /88   Pulse 84   Temp 98.9 °F (37.2 °C) (Temporal)   Resp 11   Ht 6' 2\" (1.88 m)   Wt 270 lb (122.5 kg)   SpO2 98%   BMI 34.67 kg/m²   General: No acute distress, Alert  Respiratory: No rhonchi, no wheezes  Cardiovascular: S1, S2. Regular rate and rhythm  Abdomen: Soft, Non-tender, non-distended, positive bowel sounds  Neuro: No new focal deficits  Extremities: No edema      Results:    Labs:      Labs Last 24 Hours:    Recent Labs   Lab 24  0013   RBC 6.84*   HGB 17.2   HCT 51.9   MCV 75.9*   MCH 25.1*   MCHC 33.1   RDW 19.7   NEPRELIM 13.67*   WBC 17.0*   .0       Recent Labs   Lab 24  0013   *    BUN 15   CREATSERUM 0.97   EGFRCR 90   CA 11.3*   ALB 4.8      K 3.0*   CL 98   CO2 27.0   ALKPHO 93   AST 26   ALT 28   BILT 1.4*   TP 7.9       No results found for: \"PT\", \"INR\"    Recent Labs   Lab 12/07/24  0013   TROPHS 7       No results for input(s): \"TROP\", \"PBNP\" in the last 168 hours.    No results for input(s): \"PCT\" in the last 168 hours.    Imaging: Imaging data reviewed in Epic.    Assessment & Plan:      # Abdominal pain  -CAT scan of the abdomen pelvis shows adynamic ileus  - NGT to LIS  -Will keep n.p.o.  -Continue IV fluid  -General Surgery on consult.    #Hypertension  -Will add as needed IV medication    # Hypothyroidism   -Will resume levothyroxine once tolerating diet.    # Type 2 diabetes  -Will place on hypoglycemia protocol with correction factor insulin.    # Peripheral neuropathy  -Continue gabapentin once tolerating oral.    # Depression  -Will resume Wellbutrin once tolerating diet.    Plan of care discussed with patient at bedside.    Mk Weaver, DO    Supplementary Documentation:     The 21st Century Cures Act makes medical notes like these available to patients in the interest of transparency. Please be advised this is a medical document. Medical documents are intended to carry relevant information, facts as evident, and the clinical opinion of the practitioner. The medical note is intended as peer to peer communication and may appear blunt or direct. It is written in medical language and may contain abbreviations or verbiage that are unfamiliar.                                     [1] No Known Allergies  [2]   No current facility-administered medications on file prior to encounter.     No current outpatient medications on file prior to encounter.

## 2024-12-08 ENCOUNTER — APPOINTMENT (OUTPATIENT)
Dept: GENERAL RADIOLOGY | Facility: HOSPITAL | Age: 64
End: 2024-12-08
Attending: PHYSICIAN ASSISTANT
Payer: MEDICARE

## 2024-12-08 PROBLEM — G44.89 OTHER HEADACHE SYNDROME: Status: ACTIVE | Noted: 2024-12-08

## 2024-12-08 LAB
AMMONIA PLAS-MCNC: 16 UMOL/L (ref 11–32)
ANION GAP SERPL CALC-SCNC: 10 MMOL/L (ref 0–18)
ATRIAL RATE: 90 BPM
BASOPHILS # BLD AUTO: 0.04 X10(3) UL (ref 0–0.2)
BASOPHILS NFR BLD AUTO: 0.3 %
BILIRUB UR QL STRIP.AUTO: NEGATIVE
BUN BLD-MCNC: 15 MG/DL (ref 9–23)
CALCIUM BLD-MCNC: 9.4 MG/DL (ref 8.7–10.4)
CHLORIDE SERPL-SCNC: 105 MMOL/L (ref 98–112)
CLARITY UR REFRACT.AUTO: CLEAR
CO2 SERPL-SCNC: 24 MMOL/L (ref 21–32)
COLOR UR AUTO: YELLOW
CREAT BLD-MCNC: 0.95 MG/DL
EGFRCR SERPLBLD CKD-EPI 2021: 89 ML/MIN/1.73M2 (ref 60–?)
EOSINOPHIL # BLD AUTO: 0.16 X10(3) UL (ref 0–0.7)
EOSINOPHIL NFR BLD AUTO: 1.2 %
ERYTHROCYTE [DISTWIDTH] IN BLOOD BY AUTOMATED COUNT: 20.1 %
GLUCOSE BLD-MCNC: 108 MG/DL (ref 70–99)
GLUCOSE BLD-MCNC: 123 MG/DL (ref 70–99)
GLUCOSE BLD-MCNC: 139 MG/DL (ref 70–99)
GLUCOSE BLD-MCNC: 155 MG/DL (ref 70–99)
GLUCOSE BLD-MCNC: 72 MG/DL (ref 70–99)
GLUCOSE UR STRIP.AUTO-MCNC: >1000 MG/DL
HCT VFR BLD AUTO: 47.4 %
HGB BLD-MCNC: 15.3 G/DL
IMM GRANULOCYTES # BLD AUTO: 0.05 X10(3) UL (ref 0–1)
IMM GRANULOCYTES NFR BLD: 0.4 %
KETONES UR STRIP.AUTO-MCNC: 20 MG/DL
LEUKOCYTE ESTERASE UR QL STRIP.AUTO: NEGATIVE
LYMPHOCYTES # BLD AUTO: 1.78 X10(3) UL (ref 1–4)
LYMPHOCYTES NFR BLD AUTO: 13.7 %
MAGNESIUM SERPL-MCNC: 1.9 MG/DL (ref 1.6–2.6)
MCH RBC QN AUTO: 24.9 PG (ref 26–34)
MCHC RBC AUTO-ENTMCNC: 32.3 G/DL (ref 31–37)
MCV RBC AUTO: 77.1 FL
MONOCYTES # BLD AUTO: 0.99 X10(3) UL (ref 0.1–1)
MONOCYTES NFR BLD AUTO: 7.6 %
NEUTROPHILS # BLD AUTO: 9.96 X10 (3) UL (ref 1.5–7.7)
NEUTROPHILS # BLD AUTO: 9.96 X10(3) UL (ref 1.5–7.7)
NEUTROPHILS NFR BLD AUTO: 76.8 %
NITRITE UR QL STRIP.AUTO: NEGATIVE
OSMOLALITY SERPL CALC.SUM OF ELEC: 290 MOSM/KG (ref 275–295)
P AXIS: -5 DEGREES
P-R INTERVAL: 102 MS
PH UR STRIP.AUTO: 7 [PH] (ref 5–8)
PLATELET # BLD AUTO: 214 10(3)UL (ref 150–450)
POTASSIUM SERPL-SCNC: 2.9 MMOL/L (ref 3.5–5.1)
POTASSIUM SERPL-SCNC: 3.2 MMOL/L (ref 3.5–5.1)
PROT UR STRIP.AUTO-MCNC: 70 MG/DL
Q-T INTERVAL: 354 MS
QRS DURATION: 106 MS
QTC CALCULATION (BEZET): 433 MS
R AXIS: 51 DEGREES
RBC # BLD AUTO: 6.15 X10(6)UL
RBC UR QL AUTO: NEGATIVE
SODIUM SERPL-SCNC: 139 MMOL/L (ref 136–145)
SP GR UR STRIP.AUTO: >1.03 (ref 1–1.03)
T AXIS: 54 DEGREES
UROBILINOGEN UR STRIP.AUTO-MCNC: NORMAL MG/DL
VENTRICULAR RATE: 90 BPM
WBC # BLD AUTO: 13 X10(3) UL (ref 4–11)

## 2024-12-08 PROCEDURE — 74021 RADEX ABDOMEN 3+ VIEWS: CPT | Performed by: PHYSICIAN ASSISTANT

## 2024-12-08 PROCEDURE — 99232 SBSQ HOSP IP/OBS MODERATE 35: CPT | Performed by: SURGERY

## 2024-12-08 RX ORDER — KETOROLAC TROMETHAMINE 15 MG/ML
15 INJECTION, SOLUTION INTRAMUSCULAR; INTRAVENOUS EVERY 6 HOURS PRN
Status: DISPENSED | OUTPATIENT
Start: 2024-12-08 | End: 2024-12-10

## 2024-12-08 RX ORDER — POTASSIUM CHLORIDE 1500 MG/1
40 TABLET, EXTENDED RELEASE ORAL EVERY 4 HOURS
Status: COMPLETED | OUTPATIENT
Start: 2024-12-08 | End: 2024-12-08

## 2024-12-08 RX ORDER — DEXTROSE MONOHYDRATE 25 G/50ML
INJECTION, SOLUTION INTRAVENOUS
Status: COMPLETED
Start: 2024-12-08 | End: 2024-12-08

## 2024-12-08 RX ORDER — DEXTROSE MONOHYDRATE AND SODIUM CHLORIDE 5; .9 G/100ML; G/100ML
INJECTION, SOLUTION INTRAVENOUS CONTINUOUS
Status: DISCONTINUED | OUTPATIENT
Start: 2024-12-08 | End: 2024-12-10

## 2024-12-08 RX ORDER — KETOROLAC TROMETHAMINE 15 MG/ML
INJECTION, SOLUTION INTRAMUSCULAR; INTRAVENOUS
Status: COMPLETED
Start: 2024-12-08 | End: 2024-12-08

## 2024-12-08 RX ORDER — ACETAMINOPHEN 10 MG/ML
1000 INJECTION, SOLUTION INTRAVENOUS EVERY 8 HOURS PRN
Status: DISCONTINUED | OUTPATIENT
Start: 2024-12-08 | End: 2024-12-10

## 2024-12-08 RX ORDER — DULOXETIN HYDROCHLORIDE 30 MG/1
60 CAPSULE, DELAYED RELEASE ORAL 2 TIMES DAILY
Status: DISCONTINUED | OUTPATIENT
Start: 2024-12-09 | End: 2024-12-10

## 2024-12-08 RX ORDER — ATENOLOL AND CHLORTHALIDONE TABLET 100; 25 MG/1; MG/1
1 TABLET ORAL DAILY
Status: DISCONTINUED | OUTPATIENT
Start: 2024-12-09 | End: 2024-12-10

## 2024-12-08 RX ORDER — BUPROPION HYDROCHLORIDE 100 MG/1
100 TABLET, EXTENDED RELEASE ORAL DAILY
Status: DISCONTINUED | OUTPATIENT
Start: 2024-12-09 | End: 2024-12-08

## 2024-12-08 RX ORDER — LISINOPRIL 40 MG/1
40 TABLET ORAL DAILY
Status: DISCONTINUED | OUTPATIENT
Start: 2024-12-09 | End: 2024-12-10

## 2024-12-08 RX ORDER — DEXTROSE MONOHYDRATE 25 G/50ML
50 INJECTION, SOLUTION INTRAVENOUS AS NEEDED
Status: DISCONTINUED | OUTPATIENT
Start: 2024-12-08 | End: 2024-12-10

## 2024-12-08 NOTE — PLAN OF CARE
Patient admitted for SBO.  NG tube secured at 60 cm, LIS.  Medications given as ordered.  Potassium replaced per protocol.  Plan for continued NPO, NG tube, and daily radiograph of abdomen.

## 2024-12-08 NOTE — CODE DOCUMENTATION
[facility]     Jd Chaves Patient Status:  Inpatient    1960 MRN [id number]   Location [facility] 3SW-A Attending Maki Matt MD   Hosp Day # 1 PCP RADHA REYES MD     Reason for RRT:Confusion and sudden worsening of headache    Physical Exam:    /84 (BP Location: Left arm)   Pulse 84   Temp 98.2 °F (36.8 °C) (Oral)   Resp 17   Ht 6' 2\" (1.88 m)   Wt 270 lb (122.5 kg)   SpO2 98%   BMI 34.67 kg/m²   General: Patient awake but with some confusion  Respiratory: CTAB  Cardiovascular: Regular rhythm  Abdomen: Soft, distended, decreased bowel sounds  Neurologic: Patient moving all extremities no focal deficits  Extremities: No C/C/E    Diagnostic Data:      Labs: Reviewed    Imaging: Reviewed    ASSESSMENT / PLAN:     Headache  -Worsening overnight  -Patient denies thunderclap headache   -No neck pain or stiffness  -Give 15 of IV Toradol  -After half hour pain decreased from 10 out of 10 to 4 out of 10.    2.  Confusion  -Patient may have underlying dementia  -Will check a UA and ammonia level.    # SBO  -Patient has NG tube to low intermittent suction    Critical care time: 35 minutes  [id number]    Mk Weaver, DO  2024

## 2024-12-08 NOTE — PROGRESS NOTES
[facility]  Progress Note    Jd Chaves Patient Status:  Inpatient    1960 MRN [id number]   Location [facility] [place] 3SW-A Attending Maki Matt MD   Hosp Day # 1 PCP RADHA REYES MD     Subjective:  Patient states he is feeling a little better today.  Morning notes reviewed.  Patient had a rapid response called on him following attempt at BM -developed some confusion and headache.  States he is feeling better now.  NG remains to suction.  He is passing a small amount of flatus.  He reports his abdominal bloating and pain has improved with NG.  Currently denying any nausea.  No fevers or chills.    Objective/Physical Exam:  General: Alert, orientated x3.  Cooperative.  No apparent distress.  Vital Signs:  Blood pressure 132/80, pulse 73, temperature 98.4 °F (36.9 °C), temperature source Oral, resp. rate 18, height 74\", weight 270 lb (122.5 kg), SpO2 96%.  Lungs: No respiratory distress.  Cardiac: Regular rate and rhythm.   Abdomen:  Soft, non distended, non tender, with no rebound or guarding.  No peritoneal signs.   Extremities:  No lower extremity edema noted.        Labs:  Lab Results   Component Value Date    WBC 13.0 2024    HGB 15.3 2024    HCT 47.4 2024    .0 2024     Lab Results   Component Value Date     2024    K 2.9 2024     2024    CO2 24.0 2024    BUN 15 2024    CREATSERUM 0.95 2024     2024    CA 9.4 2024     No results found for: \"PT\", \"INR\"    I/O last 3 completed shifts:  In: 1000 [I.V.:1000]  Out: 3400 [Emesis/NG output:3400]  I/O this shift:  In: -   Out: 750 [Emesis/NG output:750]    Assessment  Patient Active Problem List   Diagnosis    SBO (small bowel obstruction) (HCC)    Hypokalemia    Primary hypertension    Acquired hypothyroidism    Type 2 diabetes mellitus without complication, without long-term current use of insulin (HCC)    Polyneuropathy associated with underlying  Yes disease (HCC)    Other headache syndrome       SBO    Plan:  Patient is passing some flatus, though will continue with NG to LIS until more reliable bowel function.  We discussed clamp trial once passing more gas or has a bowel movement  In the interim, continue NPO.  May have ice chips, gum, hard candy.  Analgesics and antiemetics as needed  Appreciate hospitalist recommendation in regards to episode of headache and confusion.  Improved now.  Possible vagal response while attempting BM.  Ambulate and up to chair  DVT prophylaxis with Lovenox  GI prophylaxis with Protonix    Pallavi DOROTA Montes  12/8/2024  2:25 PM     Addendum:    I have seen and examined the patient; I obtained and performed the above history, physical examination, assessment and plan.  I have I have edited the above to reflect my evaluation, opinion, and physical exam findings.    Exam and plan as above.    Plan discussed.  All questions answered.  Awaiting return of bowel function.  No surgical intervention now.    I, Dr. Stas Freeman, personally performed the services described in this documentation  by Ms Simon PA-C, and they are both accurate and complete.    Stas Freeman MD FACS  [facility]

## 2024-12-08 NOTE — PLAN OF CARE
RRT    *See RRT Documentation Record*    Reason the RRT was called: pt shaky, not making sense, unable to follow directions, impulsive, excruciating sudden headache after trying to have BM   Assessment of patient leading up to RRT: pt was assisted back to bed from bathroom, started shaking excessively, not making sense, screaming in pain with headache  Interventions/Testing: BG was 72, d50 amp pushed, IVF changed to d5 normal saline, BG rechecked, toradol given, morphine given  Patient Outcome/Disposition: pt says headache is now 4/10  Family Notified: no

## 2024-12-08 NOTE — PLAN OF CARE
A&O x4. VSS on RA. . Pt reporting good pain relief with PRN tylenol. Ambulating with assist. Voids freely. Declines SCDs. NGT to LIS. NGT drsg changed. Reviewed POC, pain management, and fall precautions with pt. Bed alarm on w/bed in lowest position. Pt reminded to use call light.

## 2024-12-09 ENCOUNTER — APPOINTMENT (OUTPATIENT)
Dept: MRI IMAGING | Facility: HOSPITAL | Age: 64
End: 2024-12-09
Attending: STUDENT IN AN ORGANIZED HEALTH CARE EDUCATION/TRAINING PROGRAM
Payer: MEDICARE

## 2024-12-09 ENCOUNTER — APPOINTMENT (OUTPATIENT)
Dept: CT IMAGING | Facility: HOSPITAL | Age: 64
End: 2024-12-09
Attending: STUDENT IN AN ORGANIZED HEALTH CARE EDUCATION/TRAINING PROGRAM
Payer: MEDICARE

## 2024-12-09 LAB
ANION GAP SERPL CALC-SCNC: 10 MMOL/L (ref 0–18)
BASOPHILS # BLD AUTO: 0.03 X10(3) UL (ref 0–0.2)
BASOPHILS NFR BLD AUTO: 0.3 %
BUN BLD-MCNC: 12 MG/DL (ref 9–23)
CALCIUM BLD-MCNC: 9.5 MG/DL (ref 8.7–10.4)
CHLORIDE SERPL-SCNC: 108 MMOL/L (ref 98–112)
CO2 SERPL-SCNC: 21 MMOL/L (ref 21–32)
CREAT BLD-MCNC: 0.85 MG/DL
EGFRCR SERPLBLD CKD-EPI 2021: 96 ML/MIN/1.73M2 (ref 60–?)
EOSINOPHIL # BLD AUTO: 0.17 X10(3) UL (ref 0–0.7)
EOSINOPHIL NFR BLD AUTO: 1.8 %
ERYTHROCYTE [DISTWIDTH] IN BLOOD BY AUTOMATED COUNT: 20.3 %
GLUCOSE BLD-MCNC: 105 MG/DL (ref 70–99)
GLUCOSE BLD-MCNC: 115 MG/DL (ref 70–99)
HCT VFR BLD AUTO: 47.2 %
HGB BLD-MCNC: 15.3 G/DL
IMM GRANULOCYTES # BLD AUTO: 0.01 X10(3) UL (ref 0–1)
IMM GRANULOCYTES NFR BLD: 0.1 %
INR BLD: 1.08 (ref 0.8–1.2)
LYMPHOCYTES # BLD AUTO: 1.29 X10(3) UL (ref 1–4)
LYMPHOCYTES NFR BLD AUTO: 13.7 %
MCH RBC QN AUTO: 25.4 PG (ref 26–34)
MCHC RBC AUTO-ENTMCNC: 32.4 G/DL (ref 31–37)
MCV RBC AUTO: 78.4 FL
MONOCYTES # BLD AUTO: 0.74 X10(3) UL (ref 0.1–1)
MONOCYTES NFR BLD AUTO: 7.8 %
NEUTROPHILS # BLD AUTO: 7.2 X10 (3) UL (ref 1.5–7.7)
NEUTROPHILS # BLD AUTO: 7.2 X10(3) UL (ref 1.5–7.7)
NEUTROPHILS NFR BLD AUTO: 76.3 %
OSMOLALITY SERPL CALC.SUM OF ELEC: 289 MOSM/KG (ref 275–295)
PLATELET # BLD AUTO: 183 10(3)UL (ref 150–450)
POTASSIUM SERPL-SCNC: 3.2 MMOL/L (ref 3.5–5.1)
POTASSIUM SERPL-SCNC: 3.8 MMOL/L (ref 3.5–5.1)
PROTHROMBIN TIME: 14.1 SECONDS (ref 11.6–14.8)
RBC # BLD AUTO: 6.02 X10(6)UL
SODIUM SERPL-SCNC: 139 MMOL/L (ref 136–145)
WBC # BLD AUTO: 9.4 X10(3) UL (ref 4–11)

## 2024-12-09 PROCEDURE — 70498 CT ANGIOGRAPHY NECK: CPT | Performed by: STUDENT IN AN ORGANIZED HEALTH CARE EDUCATION/TRAINING PROGRAM

## 2024-12-09 PROCEDURE — 99232 SBSQ HOSP IP/OBS MODERATE 35: CPT | Performed by: SURGERY

## 2024-12-09 PROCEDURE — 70551 MRI BRAIN STEM W/O DYE: CPT | Performed by: STUDENT IN AN ORGANIZED HEALTH CARE EDUCATION/TRAINING PROGRAM

## 2024-12-09 PROCEDURE — 70496 CT ANGIOGRAPHY HEAD: CPT | Performed by: STUDENT IN AN ORGANIZED HEALTH CARE EDUCATION/TRAINING PROGRAM

## 2024-12-09 RX ORDER — DEXAMETHASONE SODIUM PHOSPHATE 4 MG/ML
4 VIAL (ML) INJECTION ONCE
Status: COMPLETED | OUTPATIENT
Start: 2024-12-09 | End: 2024-12-09

## 2024-12-09 RX ORDER — DIPHENHYDRAMINE HYDROCHLORIDE 50 MG/ML
25 INJECTION INTRAMUSCULAR; INTRAVENOUS ONCE
Status: COMPLETED | OUTPATIENT
Start: 2024-12-09 | End: 2024-12-09

## 2024-12-09 RX ORDER — PROCHLORPERAZINE EDISYLATE 5 MG/ML
10 INJECTION INTRAMUSCULAR; INTRAVENOUS ONCE
Status: COMPLETED | OUTPATIENT
Start: 2024-12-09 | End: 2024-12-09

## 2024-12-09 RX ORDER — KETOROLAC TROMETHAMINE 15 MG/ML
15 INJECTION, SOLUTION INTRAMUSCULAR; INTRAVENOUS ONCE
Status: COMPLETED | OUTPATIENT
Start: 2024-12-09 | End: 2024-12-09

## 2024-12-09 RX ORDER — LORAZEPAM 2 MG/ML
0.5 INJECTION INTRAMUSCULAR ONCE
Status: DISCONTINUED | OUTPATIENT
Start: 2024-12-09 | End: 2024-12-10

## 2024-12-09 NOTE — PROGRESS NOTES
[facility]   part of [facility]     Hospitalist Progress Note     Jd Chaves Patient Status:  Inpatient    1960 MRN [id number]   Location [facility] 3SW-A Attending Maki Matt MD   Hosp Day # 2 PCP RADHA REYES MD     Chief Complaint: SBO     Subjective:     Patient  ongoing headache holding his left temple, sound/ light bothersome , wife at bedside.    Objective:    Review of Systems:   A comprehensive review of systems was completed; pertinent positive and negatives stated in subjective.    Vital signs:  Temp:  [98.1 °F (36.7 °C)-98.4 °F (36.9 °C)] 98.2 °F (36.8 °C)  Pulse:  [62-84] 62  Resp:  [16-18] 16  BP: (132-164)/(62-93) 145/83  SpO2:  [95 %-98 %] 95 %    Physical Exam:    General: No acute distress  Respiratory: No wheezes, no rhonchi  Cardiovascular: S1, S2, regular rate and rhythm  Abdomen: Soft, Non-tender, non-distended, positive bowel sounds  Neuro: No new focal deficits.   Extremities: No edema      Diagnostic Data:    Labs:  Recent Labs   Lab 24  0013 24  0610 24  0941   WBC 17.0* 13.0* 9.4   HGB 17.2 15.3 15.3   MCV 75.9* 77.1* 78.4*   .0 214.0 183.0   INR  --   --  1.08       Recent Labs   Lab 24  0013 24  0610 24  1520 24  0444 24  0941   * 123*  --   --  115*   BUN 15 15  --   --  12   CREATSERUM 0.97 0.95  --   --  0.85   CA 11.3* 9.4  --   --  9.5   ALB 4.8  --   --   --   --     139  --   --  139   K 3.0* 2.9* 3.2* 3.2* 3.8   CL 98 105  --   --  108   CO2 27.0 24.0  --   --  21.0   ALKPHO 93  --   --   --   --    AST 26  --   --   --   --    ALT 28  --   --   --   --    BILT 1.4*  --   --   --   --    TP 7.9  --   --   --   --        Estimated Creatinine Clearance: 100.7 mL/min (based on SCr of 0.85 mg/dL).    Recent Labs   Lab 24  0013   TROPHS 7       Recent Labs   Lab 24  0941   PTP 14.1   INR 1.08                  Microbiology    No results found for this visit on  12/07/24.      Imaging: Reviewed in Epic.    Medications:    diphenhydrAMINE  25 mg Intravenous Once    ketorolac  15 mg Intravenous Once    prochlorperazine  10 mg Intravenous Once    atenolol-chlorthalidone  1 tablet Oral Daily    DULoxetine  60 mg Oral BID    lisinopril  40 mg Oral Daily    enoxaparin  40 mg Subcutaneous Daily    pantoprazole  40 mg Intravenous BID    polyethylene glycol (PEG 3350)  17 g Oral Daily       Assessment & Plan:      #SBO  NGT to LIS  Surgery on Cs  #Hypertension  PRN meds while NPO  #Migraine headache- intractable  MRI 12/9/24 head no acute findings  Migraine cocktail   CT head on admission no change  #Hypothyroid   #DM type 2 with PN   ISS    Maki Matt MD    Supplementary Documentation:     Quality:  DVT Mechanical Prophylaxis:        DVT Pharmacologic Prophylaxis   Medication    enoxaparin (Lovenox) 40 MG/0.4ML SUBQ injection 40 mg                Code Status: Not on file  Mcnally: No urinary catheter in place  Mcnally Duration (in days):   Central line:    CRISTOBAL:     Discharge is dependent on: SBO  At this point Mr. Chaves is expected to be discharge to:  home    The 21st Century Cures Act makes medical notes like these available to patients in the interest of transparency. Please be advised this is a medical document. Medical documents are intended to carry relevant information, facts as evident, and the clinical opinion of the practitioner. The medical note is intended as peer to peer communication and may appear blunt or direct. It is written in medical language and may contain abbreviations or verbiage that are unfamiliar.              **Certification      PHYSICIAN Certification of Need for Inpatient Hospitalization - Initial Certification    Patient will require inpatient services that will reasonably be expected to span two midnight's based on the clinical documentation in H+P.   Based on patients current state of illness, I anticipate that, after discharge, patient will  require TBD.    Called wife with updates upon MRI, CTA H&N- no acute findings

## 2024-12-09 NOTE — PLAN OF CARE
ED admit 12/7 SBO, Pt is AAOX4, forgetful at times see note, NGT to LIS, brownish red output, VSS, room air, IVF, K+ replaced, NPO w/ sips and candy, up SBA, unsteady at times, complaining of crushing headache at this time, IV meds given to mild avail, Pt having blurry vision as well, hospitalist notified, stat MRI and CTA of brain ordered, rapid response called D/T presentation per MD request, mild confusion and neuro change, spouse at bedside aware of POC, all needs met, all safety measures in place, call light within reach, will CTM.

## 2024-12-09 NOTE — PROGRESS NOTES
[facility]  Progress Note    Jd Chaves Patient Status:  Inpatient    1960 MRN [id number]   Location [facility] 3SW-A Attending Maki Matt MD   Hosp Day # 2 PCP RADHA REYES MD     Subjective:  He is seen and examined laying in bed. He denies abdominal pain. He reports his Ng tube has been clamped for most of the day as he was getting a CT of the head. He denies worsening pain, distention, nausea or vomiting. He reports passing flatus but denies a bowel movement. NG tube with 650 mL of output this AM.     Objective/Physical Exam:  /72 (BP Location: Right arm)   Pulse 65   Temp 99.5 °F (37.5 °C) (Oral)   Resp 16   Ht 74\"   Wt 270 lb (122.5 kg)   SpO2 94%   BMI 34.67 kg/m²     Intake/Output Summary (Last 24 hours) at 2024 1555  Last data filed at 2024 0900  Gross per 24 hour   Intake --   Output 850 ml   Net -850 ml         General: Awake, Alert,  Cooperative.  No apparent distress.  HEENT: Normocephalic, atraumatic. No scleral icterus.   Pulm: no respiratory distress, no increased work of breathing  Abdomen:  Soft, non-distended, non-tender, with no rebound or guarding.  No peritoneal signs.  Skin: Warm, dry. No jaundice.     Labs:  Lab Results   Component Value Date    WBC 9.4 2024    HGB 15.3 2024    HCT 47.2 2024    .0 2024      Lab Results   Component Value Date    INR 1.08 2024     Lab Results   Component Value Date     2024    K 3.8 2024     2024    CO2 21.0 2024    BUN 12 2024    CREATSERUM 0.85 2024     2024    CA 9.5 2024            Assessment:  Patient Active Problem List   Diagnosis    SBO (small bowel obstruction) (HCC)    Hypokalemia    Primary hypertension    Acquired hypothyroidism    Type 2 diabetes mellitus without complication, without long-term current use of insulin (HCC)    Polyneuropathy associated with underlying disease (HCC)    Other  headache syndrome       Small bowel obstruction     Plan:  Clamp NG tube. Can trial clear liquids while NG tube is clamped, if he tolerates clear liquid diet, NG tube may be removed. If he has worsening abdominal pain, nausea or vomiting, NG tube should be reconnected to low intermittent suction  Analgesics and antiemetics as needed  Encourage ambulation and up to chair  Medical management per primary   DVT prophylaxis with lovenox   GI prophylaxis with protonix       Barbara Romero PA-C  12/9/2024  3:55 PM    Addendum:    I have seen and examined the patient; I obtained and performed the above history, physical examination, assessment and plan.  I have I have edited the above to reflect my evaluation, opinion, and physical exam findings.    Exam and plan as above.    Persistent headaches.  CT brain normal.  Abdomen soft nondistended.  NG tube was clamped throughout the entire day due to testing and the patient remained asymptomatic.  Trial of diet now.  If successful remove NG tube.    I, Dr. Stas Freeman, personally performed the services described in this documentation  by Ms Heather PA-C, and they are both accurate and complete.    Stas Freeman MD FACS  [facility]

## 2024-12-09 NOTE — PROGRESS NOTES
[facility]   part of [facility]     Hospitalist Progress Note     Jd Chaves Patient Status:  Inpatient    1960 MRN [id number]   Location [facility] 3SW-A Attending Maki Matt MD   Hosp Day # 2 PCP RADHA REYES MD     Chief Complaint: SBO     Subjective:     Patient  with headache this AM, tells me it was present on admission, overnight worsened. NGT in place.    Objective:    Review of Systems:   A comprehensive review of systems was completed; pertinent positive and negatives stated in subjective.    Vital signs:  Temp:  [98.1 °F (36.7 °C)-98.4 °F (36.9 °C)] 98.2 °F (36.8 °C)  Pulse:  [62-84] 62  Resp:  [16-18] 16  BP: (132-164)/(62-93) 145/83  SpO2:  [95 %-98 %] 95 %    Physical Exam:    General: No acute distress  Respiratory: No wheezes, no rhonchi  Cardiovascular: S1, S2, regular rate and rhythm  Abdomen: Soft, Non-tender, non-distended, positive bowel sounds  Neuro: No new focal deficits.   Extremities: No edema      Diagnostic Data:    Labs:  Recent Labs   Lab 24  0013 24  0610 24  0941   WBC 17.0* 13.0* 9.4   HGB 17.2 15.3 15.3   MCV 75.9* 77.1* 78.4*   .0 214.0 183.0   INR  --   --  1.08       Recent Labs   Lab 24  0013 24  0610 24  1520 24  0444 24  0941   * 123*  --   --  115*   BUN 15 15  --   --  12   CREATSERUM 0.97 0.95  --   --  0.85   CA 11.3* 9.4  --   --  9.5   ALB 4.8  --   --   --   --     139  --   --  139   K 3.0* 2.9* 3.2* 3.2* 3.8   CL 98 105  --   --  108   CO2 27.0 24.0  --   --  21.0   ALKPHO 93  --   --   --   --    AST 26  --   --   --   --    ALT 28  --   --   --   --    BILT 1.4*  --   --   --   --    TP 7.9  --   --   --   --        Estimated Creatinine Clearance: 100.7 mL/min (based on SCr of 0.85 mg/dL).    Recent Labs   Lab 24  0013   TROPHS 7       Recent Labs   Lab 24  0941   PTP 14.1   INR 1.08                  Microbiology    No results found for this visit on  12/07/24.      Imaging: Reviewed in Epic.    Medications:    diphenhydrAMINE  25 mg Intravenous Once    ketorolac  15 mg Intravenous Once    prochlorperazine  10 mg Intravenous Once    atenolol-chlorthalidone  1 tablet Oral Daily    DULoxetine  60 mg Oral BID    lisinopril  40 mg Oral Daily    enoxaparin  40 mg Subcutaneous Daily    pantoprazole  40 mg Intravenous BID    polyethylene glycol (PEG 3350)  17 g Oral Daily       Assessment & Plan:      #SBO  NGT to LIS  Surgery on Cs  #Hypertension  PRN meds while NPO  #Migraine headache  CT head on admission no change  #Hypothyroid   #DM type 2 with PN   ISS    Maki Matt MD    Supplementary Documentation:     Quality:  DVT Mechanical Prophylaxis:        DVT Pharmacologic Prophylaxis   Medication    enoxaparin (Lovenox) 40 MG/0.4ML SUBQ injection 40 mg                Code Status: Not on file  Mcnally: No urinary catheter in place  Mcnally Duration (in days):   Central line:    CRISTOBAL:     Discharge is dependent on: SBO  At this point Mr. Chaves is expected to be discharge to:  home    The 21st Century Cures Act makes medical notes like these available to patients in the interest of transparency. Please be advised this is a medical document. Medical documents are intended to carry relevant information, facts as evident, and the clinical opinion of the practitioner. The medical note is intended as peer to peer communication and may appear blunt or direct. It is written in medical language and may contain abbreviations or verbiage that are unfamiliar.              **Certification      PHYSICIAN Certification of Need for Inpatient Hospitalization - Initial Certification    Patient will require inpatient services that will reasonably be expected to span two midnight's based on the clinical documentation in H+P.   Based on patients current state of illness, I anticipate that, after discharge, patient will require TBD.

## 2024-12-10 ENCOUNTER — APPOINTMENT (OUTPATIENT)
Dept: GENERAL RADIOLOGY | Facility: HOSPITAL | Age: 64
End: 2024-12-10
Attending: PHYSICIAN ASSISTANT
Payer: MEDICARE

## 2024-12-10 VITALS
HEIGHT: 74 IN | SYSTOLIC BLOOD PRESSURE: 183 MMHG | OXYGEN SATURATION: 97 % | RESPIRATION RATE: 18 BRPM | DIASTOLIC BLOOD PRESSURE: 97 MMHG | WEIGHT: 270 LBS | HEART RATE: 69 BPM | BODY MASS INDEX: 34.65 KG/M2 | TEMPERATURE: 99 F

## 2024-12-10 LAB — POTASSIUM SERPL-SCNC: 3.4 MMOL/L (ref 3.5–5.1)

## 2024-12-10 PROCEDURE — 99239 HOSP IP/OBS DSCHRG MGMT >30: CPT | Performed by: STUDENT IN AN ORGANIZED HEALTH CARE EDUCATION/TRAINING PROGRAM

## 2024-12-10 PROCEDURE — 74019 RADEX ABDOMEN 2 VIEWS: CPT | Performed by: PHYSICIAN ASSISTANT

## 2024-12-10 RX ORDER — PANTOPRAZOLE SODIUM 40 MG/1
40 TABLET, DELAYED RELEASE ORAL
Status: DISCONTINUED | OUTPATIENT
Start: 2024-12-10 | End: 2024-12-10

## 2024-12-10 RX ORDER — MIRABEGRON 50 MG/1
50 TABLET, FILM COATED, EXTENDED RELEASE ORAL DAILY
Status: DISCONTINUED | OUTPATIENT
Start: 2024-12-10 | End: 2024-12-10

## 2024-12-10 RX ORDER — BUSPIRONE HYDROCHLORIDE 5 MG/1
5 TABLET ORAL 2 TIMES DAILY
Status: DISCONTINUED | OUTPATIENT
Start: 2024-12-10 | End: 2024-12-10

## 2024-12-10 RX ORDER — GABAPENTIN 600 MG/1
600 TABLET ORAL 2 TIMES DAILY
Status: DISCONTINUED | OUTPATIENT
Start: 2024-12-10 | End: 2024-12-10

## 2024-12-10 RX ORDER — POTASSIUM CHLORIDE 1500 MG/1
40 TABLET, EXTENDED RELEASE ORAL ONCE
Status: COMPLETED | OUTPATIENT
Start: 2024-12-10 | End: 2024-12-10

## 2024-12-10 RX ORDER — POTASSIUM CHLORIDE 1500 MG/1
40 TABLET, EXTENDED RELEASE ORAL ONCE
Status: DISCONTINUED | OUTPATIENT
Start: 2024-12-10 | End: 2024-12-10

## 2024-12-10 RX ORDER — LEVOTHYROXINE SODIUM 200 UG/1
200 TABLET ORAL
Status: DISCONTINUED | OUTPATIENT
Start: 2024-12-10 | End: 2024-12-10

## 2024-12-10 RX ORDER — BISACODYL 10 MG
10 SUPPOSITORY, RECTAL RECTAL ONCE
Status: COMPLETED | OUTPATIENT
Start: 2024-12-10 | End: 2024-12-10

## 2024-12-10 NOTE — PROGRESS NOTES
[facility]   part of [facility]     Hospitalist Progress Note     Jd Chaves Patient Status:  Inpatient    1960 MRN [id number]   Location [facility] 3SW-A Attending Maki Matt MD   Hosp Day # 3 PCP RADHA REYES MD     Chief Complaint: SBO     Subjective:     Patient  eating NGT out    Objective:    Review of Systems:   A comprehensive review of systems was completed; pertinent positive and negatives stated in subjective.    Vital signs:  Temp:  [97.7 °F (36.5 °C)-99 °F (37.2 °C)] 98.8 °F (37.1 °C)  Pulse:  [61-73] 63  Resp:  [16-18] 18  BP: (134-154)/(74-89) 142/83  SpO2:  [95 %-99 %] 99 %    Physical Exam:    General: No acute distress  Respiratory: No wheezes, no rhonchi  Cardiovascular: S1, S2, regular rate and rhythm  Abdomen: Soft, Non-tender, non-distended, positive bowel sounds  Neuro: No new focal deficits.   Extremities: No edema      Diagnostic Data:    Labs:  Recent Labs   Lab 24  0013 24  0610 24  0941   WBC 17.0* 13.0* 9.4   HGB 17.2 15.3 15.3   MCV 75.9* 77.1* 78.4*   .0 214.0 183.0   INR  --   --  1.08       Recent Labs   Lab 24  0013 24  0610 24  1520 24  0444 24  0941 12/10/24  0557   * 123*  --   --  115*  --    BUN 15 15  --   --  12  --    CREATSERUM 0.97 0.95  --   --  0.85  --    CA 11.3* 9.4  --   --  9.5  --    ALB 4.8  --   --   --   --   --     139  --   --  139  --    K 3.0* 2.9*   < > 3.2* 3.8 3.4*   CL 98 105  --   --  108  --    CO2 27.0 24.0  --   --  21.0  --    ALKPHO 93  --   --   --   --   --    AST 26  --   --   --   --   --    ALT 28  --   --   --   --   --    BILT 1.4*  --   --   --   --   --    TP 7.9  --   --   --   --   --     < > = values in this interval not displayed.       Estimated Creatinine Clearance: 100.7 mL/min (based on SCr of 0.85 mg/dL).    Recent Labs   Lab 24  0013   TROPHS 7       Recent Labs   Lab 24  0941   PTP 14.1   INR 1.08                   Microbiology    No results found for this visit on 12/07/24.      Imaging: Reviewed in Epic.    Medications:    levothyroxine  200 mcg Oral Before breakfast    busPIRone  5 mg Oral BID    gabapentin  600 mg Oral BID    [Held by provider] Mirabegron ER  50 mg Oral Daily    pantoprazole  40 mg Oral QAM AC    LORazepam  0.5 mg Intravenous Once    atenolol-chlorthalidone  1 tablet Oral Daily    DULoxetine  60 mg Oral BID    lisinopril  40 mg Oral Daily    enoxaparin  40 mg Subcutaneous Daily    polyethylene glycol (PEG 3350)  17 g Oral Daily       Assessment & Plan:      #SBO  NGT to LIS  Surgery on Cs  #Hypertension  PRN meds while NPO  #Migraine headache- intractable  MRI 12/9/24 head no acute findings  Migraine cocktail   CT head on admission no change  #Hypothyroid   #DM type 2 with PN   ISS    Possible DC today    Maki Matt MD    Supplementary Documentation:     Quality:  DVT Mechanical Prophylaxis:        DVT Pharmacologic Prophylaxis   Medication    enoxaparin (Lovenox) 40 MG/0.4ML SUBQ injection 40 mg                Code Status: Not on file  Mcnally: No urinary catheter in place  Mcnally Duration (in days):   Central line:    CRISTOBAL: 12/10/2024    Discharge is dependent on: SBO  At this point Mr. Chaves is expected to be discharge to:  home    The 21st Century Cures Act makes medical notes like these available to patients in the interest of transparency. Please be advised this is a medical document. Medical documents are intended to carry relevant information, facts as evident, and the clinical opinion of the practitioner. The medical note is intended as peer to peer communication and may appear blunt or direct. It is written in medical language and may contain abbreviations or verbiage that are unfamiliar.              **Certification      PHYSICIAN Certification of Need for Inpatient Hospitalization - Initial Certification    Patient will require inpatient services that will reasonably be expected to  span two midnight's based on the clinical documentation in H+P.   Based on patients current state of illness, I anticipate that, after discharge, patient will require TBD.    Called wife with updates upon MRI, CTA H&N- no acute findings

## 2024-12-10 NOTE — PAYOR COMM NOTE
--------------  ADMISSION REVIEW     Payor: [facility] TOVAR [facility]  Subscriber #:  [id number]  Authorization Number: [id number]    Admit date: 12/7/24  Admit time:  4:47 AM       REVIEW DOCUMENTATION:     ED Provider Notes        ED Provider Notes signed by Saqib Machado MD at 12/7/2024  3:50 AM       Author: Saqib Machado MD Service: -- Author Type: Physician    Filed: 12/7/2024  3:50 AM Date of Service: 12/7/2024  3:45 AM Status: Addendum    : Saqib Machado MD (Physician)    Related Notes: Original Note by Saqib Machado MD (Physician) filed at 12/7/2024  3:50 AM           Patient Seen in: [facility] Emergency Department      History     Chief Complaint   Patient presents with    Abdomen/Flank Pain     Pt co abdominal pain with nausea, history of Bowel obstruction, fentanyl and zofran given by ems with no relief     Stated Complaint:     Subjective:   HPI      Patient is a 64-year-old male presents to ED for evaluation of abdominal pain, nausea.  Patient symptoms started a couple hours ago.  Complains of a diffuse abdominal pain which is nonradiating.  History of bowel obstruction requiring bowel resection 10 years ago at outside facility.  Denies fever.  Patient complains of a mild headache.  Denies chest pain or shortness of breath.  Last bowel movement 3 days ago.    Objective:     Past Medical History:    Bowel obstruction (HCC)    Diabetes (HCC)    Essential hypertension    Thyroid disease              History reviewed. No pertinent surgical history.             Social History     Socioeconomic History    Marital status:                   Physical Exam     ED Triage Vitals [12/07/24 0006]   BP (!) 182/112   Pulse 89   Resp 16   Temp 98.9 °F (37.2 °C)   Temp src Temporal   SpO2 100 %   O2 Device None (Room air)       Current Vitals:   Vital Signs  BP: (!) 147/94  Pulse: 84  Resp: 13  Temp: 98.9 °F (37.2 °C)  Temp src: Temporal  MAP (mmHg): (!) 109    Oxygen Therapy  SpO2: 98 %  O2  Device: None (Room air)        Physical Exam  GENERAL: Patient appears moderately uncomfortable due to pain.  Alert and oriented X 3   HEENT: Normocephalic, atraumatic.  Moist mucous membranes.  Pupils equal round reactive to light and accommodation, extraocular motion is intact, sclerae white, conjunctiva is pink.  Oropharynx is unremarkable, no exudate.  NECK: Supple, trachea midline, no lymphadenopathy.   LUNG: Lungs clear to auscultation bilaterally, no wheezing, no rales, no rhonchi.  CARDIOVASCULAR: Regular rate and rhythm.  Normal S1S2.  No S3S4 or murmur.  ABDOMEN: Bowel sounds are present and hyperactive.  He has a distended abdomen with moderate diffuse tenderness noted  MUSCULOSKELETAL: No calf tenderness.  Dorsalis and Posterior Tibial pulses present. No clubbing. No cyanosis.  No edema.   SKIN EXAMINATIoN: Warm and dry with normal appearance.  No rashes or lesions.  NEUROLOGICAL:  Motor strength intact all groups.  normal sensation, speech intact    ED Course     Labs Reviewed   COMP METABOLIC PANEL (14) - Abnormal; Notable for the following components:       Result Value    Glucose 122 (*)     Potassium 3.0 (*)     Calcium, Total 11.3 (*)     Bilirubin, Total 1.4 (*)     All other components within normal limits   LIPASE - Abnormal; Notable for the following components:    Lipase 66 (*)     All other components within normal limits   CBC WITH DIFFERENTIAL WITH PLATELET - Abnormal; Notable for the following components:    WBC 17.0 (*)     RBC 6.84 (*)     MCV 75.9 (*)     MCH 25.1 (*)     Neutrophil Absolute Prelim 13.67 (*)     Neutrophil Absolute 13.67 (*)     All other components within normal limits   POCT GLUCOSE - Abnormal; Notable for the following components:    POC Glucose 122 (*)     All other components within normal limits   TROPONIN I HIGH SENSITIVITY - Normal   RAINBOW DRAW LAVENDER   RAINBOW DRAW LIGHT GREEN   RAINBOW DRAW BLUE   Potassium 3.0.  Lipase 66.  White blood cell count  17.0  EKG    Rate, intervals and axes as noted on EKG Report.  Rate: 87  Rhythm: Sinus Rhythm  Reading: No acute changes                I personally reviewd CT images of abdomen and pelvis and independent interpretation shows dilated small bowel loops with dilated  stomach.  I also viewed formal radiology report as read by radiology with findings below:    CT of abdomen and pelvis read by [facility]ology shows numerous distended loops of small bowel with air-fluid levels and stasis of contents measuring up to 4.3 cm without definitive transition point.  Could be ileus or early/partial small bowel obstruction.  CT head shows no acute process    I personally reviewed xray films of chest x 2 and independent interpretation shows initial x-ray shows NG tube which appears to be within the tip of the stomach.  It was advanced and repeat x-ray obtained showing better NG tube placement.  I also reviewed formal xray report as read by radiology with findings below:    Xray of chest read by [facility] radiology shows NG tube within the tip of the stomach.  Second x-ray shows better placement with NG tube in the stomach    Medications   morphINE PF 4 MG/ML injection 4 mg (4 mg Intravenous Given 12/7/24 0233)   potassium chloride 40 mEq in 250mL sodium chloride 0.9% IVPB premix (40 mEq Intravenous New Bag 12/7/24 0151)   sodium chloride 0.9 % IV bolus 1,000 mL (0 mL Intravenous Stopped 12/7/24 0154)   ondansetron (Zofran) 4 MG/2ML injection 4 mg (4 mg Intravenous Given 12/7/24 0028)   iopamidol 76% (ISOVUE-370) injection for power injector (100 mL Intravenous Given 12/7/24 0048)   ondansetron (Zofran) 4 MG/2ML injection 4 mg (4 mg Intravenous Given 12/7/24 0233)   lidocaine (Urojet) 2 % urethral jelly 10 mL (10 mL Urethral Given 12/7/24 0224)         MDM      Patient is a 64-year-old male presents to ED for evaluation of abdominal pain, nausea, headache.  Differential bowel obstruction, pancreatitis, hepatitis, abdominal  aneurysm.  Patient laboratory test performed showing leukocytosis, slightly elevated white blood cell count and potassium of 3.0.  Given IV potassium rider.  CT abdomen pelvis shows distended bowel loops with distended stomach consistent with early partial small bowel obstruction.  Favor partial small bowel obstruction.  NG tube inserted.  Case discussed with hospitalist as well as general surgery.  Patient will be admitted to the hospital.  Workup and results were discussed with patient. Patient has no other questions, complaints or concerns. Patient will be admitted to the hospital for further workup.    Admission disposition: 12/7/2024  1:33 AM           Medical Decision Making      Disposition and Plan     Clinical Impression:  1. SBO (small bowel obstruction) (HCC)    2. Hypokalemia    3.  Leukocytosis    Disposition:  Admit  12/7/2024  1:33 am    Follow-up:  No follow-up provider specified.        Medications Prescribed:  There are no discharge medications for this patient.          Supplementary Documentation:         Hospital Problems       Present on Admission             ICD-10-CM Noted POA    * (Principal) SBO (small bowel obstruction) (HCC) K56.609 12/7/2024 Unknown                                                          Signed by Saqib Machado MD on 12/7/2024  3:50 AM         MEDICATIONS ADMINISTERED IN LAST 1 DAY:  acetaminophen (Tylenol Extra Strength) tab 500 mg       Date Action Dose Route User    12/10/2024 1205 Given 500 mg Oral Ricardo Pozo RN          atenolol-chlorthalidone (TENORETIC) 100-25 MG per tablet 1 tablet       Date Action Dose Route User    12/10/2024 1159 Given 1 tablet Oral Ricardo Pozo RN    12/9/2024 2046 Given 1 tablet Oral Hal Swenson RN          bisacodyl (Dulcolax) 10 MG rectal suppository 10 mg       Date Action Dose Route User    12/10/2024 1200 Given 10 mg Rectal Ricardo Pozo RN          dexamethasone (Decadron) 4 MG/ML injection 4 mg       Date Action Dose  Route User    12/9/2024 2046 Given 4 mg Intravenous Hal Swenson RN          DULoxetine (Cymbalta) DR cap 60 mg       Date Action Dose Route User    12/10/2024 1159 Given 60 mg Oral Ricardo Pozo RN    12/9/2024 2044 Given 60 mg Oral Hal Swenson RN          gabapentin (Neurontin) tab 600 mg       Date Action Dose Route User    12/10/2024 1159 Given 600 mg Oral Ricardo Pozo RN          levothyroxine (Synthroid) tab 200 mcg       Date Action Dose Route User    12/10/2024 1200 Given 200 mcg Oral Ricardo Pozo RN          lisinopril (Prinivil; Zestril) tab 40 mg       Date Action Dose Route User    12/10/2024 1159 Given 40 mg Oral Ricardo Pozo RN          pantoprazole (Protonix) 40 mg in sodium chloride 0.9% PF 10 mL IV push       Date Action Dose Route User    12/9/2024 2046 Given 40 mg Intravenous Hal Swenson RN          pantoprazole (Protonix) DR tab 40 mg       Date Action Dose Route User    12/10/2024 1159 Given 40 mg Oral Ricardo Pozo RN          polyethylene glycol (PEG 3350) (Miralax) 17 g oral packet 17 g       Date Action Dose Route User    12/10/2024 1159 Given 17 g Oral Ricardo Pozo RN          potassium chloride (Klor-Con M20) tab 40 mEq       Date Action Dose Route User    12/10/2024 1200 Given 40 mEq Oral Ricardo Pozo RN            Vitals (last day)       Date/Time Temp Pulse Resp BP SpO2 Weight O2 Device O2 Flow Rate (L/min) Who    12/10/24 1133 98.8 °F (37.1 °C) 63 18 142/83 99 % -- None (Room air) -- NS    12/10/24 0750 97.7 °F (36.5 °C) 61 18 134/79 97 % -- None (Room air) -- NS    12/10/24 0415 97.7 °F (36.5 °C) 61 18 147/74 96 % -- None (Room air) -- LR    12/09/24 2340 98.4 °F (36.9 °C) 68 18 146/82 97 % -- None (Room air) -- LR    12/09/24 2040 99 °F (37.2 °C) 68 16 143/89 97 % -- None (Room air) -- LK    12/09/24 1614 99 °F (37.2 °C) 73 16 154/87 95 % -- None (Room air) -- EV    12/09/24 1200 99.5 °F (37.5 °C) 65 16 151/72 94 % -- None (Room air) -- EV    12/09/24 0730 98.2 °F (36.8  °C) 62 16 145/83 95 % -- None (Room air) -- EV    12/09/24 0514 98.1 °F (36.7 °C) -- 16 -- -- -- -- -- SM    12/09/24 0513 98.1 °F (36.7 °C) 66 16 143/62 98 % -- None (Room air) -- SM    12/09/24 0013 98.3 °F (36.8 °C) 71 16 164/93 95 % -- None (Room air) -- SM         H&P    Subjective:  History of Present Illness:      Jd Chaves is a 64 year old male with history of hypertension, diabetes presents the emergency room with abdominal pain that started a few hours before coming to the emergency room.  Abdominal pain is diffuse throughout the abdomen but nonradiating.  Patient had some nausea but no vomiting.  Patient was bowel movement was 2 days ago which was normal.  No hematochezia, melena, hematuria.  No chest pain, shortness of breath     Assessment & Plan:  # Abdominal pain  -CAT scan of the abdomen pelvis shows adynamic ileus  - NGT to LIS  -Will keep n.p.o.  -Continue IV fluid  -General Surgery on consult.     #Hypertension  -Will add as needed IV medication     # Hypothyroidism   -Will resume levothyroxine once tolerating diet.     # Type 2 diabetes  -Will place on hypoglycemia protocol with correction factor insulin.     # Peripheral neuropathy  -Continue gabapentin once tolerating oral.     # Depression  -Will resume Wellbutrin once tolerating diet.     Plan of care discussed with patient at bedside.    12/7 GENERAL SURGERY CONSULT NOTE    Reason for Consultation:  Small bowel obstruction    Chief Complaint:  Abdominal pain      History of Present Illness:  Jd Chaves is a 64 year old male who presents to [facility] on 12/7/2024 with complaints of abdominal pain.  The patient states that on the evening prior to his admission he developed generalized abdominal pain.  He reported associated abdominal bloating and distention.  He also reported nausea and vomiting.  He states that the pain was sudden onset.  He reports his last bowel movement was on the day prior to his admission.  He  reports minimal flatus.  He denies difficulty urinating.  He he denies fever or chills.  He denies a history of small bowel obstructions.  Patient states that he presented to the emergency department due to the increased abdominal pain.     Upon presentation to the hospital, the patient was afebrile and dynamically stable.  WBC 17.0 hemoglobin 17.2 platelets 258,000.  BUN 15 creatinine 0.97 AST 26 ALT 28 total bilirubin 1.4 lipase 66.Acute cholecystitis the patient was afebrile and hemodynamically stable.  CT scan of the abdomen and pelvis with findings of numerous distended loops of small bowel with air-fluid levels and stasis of contents, some of which measure up to 4.3 cm with no definite transition point, favored to represent adynamic ileus.  No free air or free fluid noted.     Past abdominal surgical history includes colon resection with Hatch's procedure for perforated diverticulitis and colostomy reversal.          Impression:      Patient Active Problem List   Diagnosis    SBO (small bowel obstruction) (HCC)    Hypokalemia    Primary hypertension    Acquired hypothyroidism    Type 2 diabetes mellitus without complication, without long-term current use of insulin (HCC)    Polyneuropathy associated with underlying disease (HCC)         Partial small bowel obstruction.     No acute surgical invention required as no evidence of strangulation on exam.     Bowel rest, IV fluid hydration, NG tube decompression until bowel function resumes.     Ambulate as tolerated.     GI and DVT prophylaxis.     Patient fails to improve or clinically declines surgical intervention may be required this hospitalization.     Once bowel function resumes Delle would be advanced as tolerated.     Care plan discussed all questions answered.    12/8 RRT NOTE    Reason for RRT:Confusion and sudden worsening of headache     Physical Exam:    /84 (BP Location: Left arm)   Pulse 84   Temp 98.2 °F (36.8 °C) (Oral)   Resp 17   Ht  6' 2\" (1.88 m)   Wt 270 lb (122.5 kg)   SpO2 98%   BMI 34.67 kg/m²   General: Patient awake but with some confusion  Respiratory: CTAB  Cardiovascular: Regular rhythm  Abdomen: Soft, distended, decreased bowel sounds  Neurologic: Patient moving all extremities no focal deficits  Extremities: No C/C/E     Diagnostic Data:       Labs: Reviewed     Imaging: Reviewed     ASSESSMENT / PLAN:      Headache  -Worsening overnight  -Patient denies thunderclap headache   -No neck pain or stiffness  -Give 15 of IV Toradol  -After half hour pain decreased from 10 out of 10 to 4 out of 10.     2.  Confusion  -Patient may have underlying dementia  -Will check a UA and ammonia level.     # SBO  -Patient has NG tube to low intermittent suction     12/8 HOSPITALIST NOTE    Chief Complaint: SBO         Subjective:  Patient  with headache this AM, tells me it was present on admission, overnight worsened. NGT in place.    Vital signs:  Temp:  [98.1 °F (36.7 °C)-98.4 °F (36.9 °C)] 98.2 °F (36.8 °C)  Pulse:  [62-84] 62  Resp:  [16-18] 16  BP: (132-164)/(62-93) 145/83  SpO2:  [95 %-98 %] 95 %      Diagnostic Data:    Labs:        Recent Labs   Lab 12/07/24  0013 12/08/24  0610 12/09/24  0941   WBC 17.0* 13.0* 9.4   HGB 17.2 15.3 15.3   MCV 75.9* 77.1* 78.4*   .0 214.0 183.0   INR  --   --  1.08                 Recent Labs   Lab 12/07/24  0013 12/08/24  0610 12/08/24  1520 12/09/24  0444 12/09/24  0941   * 123*  --   --  115*   BUN 15 15  --   --  12   CREATSERUM 0.97 0.95  --   --  0.85   CA 11.3* 9.4  --   --  9.5   ALB 4.8  --   --   --   --     139  --   --  139   K 3.0* 2.9* 3.2* 3.2* 3.8   CL 98 105  --   --  108   CO2 27.0 24.0  --   --  21.0   ALKPHO 93  --   --   --   --    AST 26  --   --   --   --    ALT 28  --   --   --   --    BILT 1.4*  --   --   --   --    TP 7.9  --   --   --   --       12/8 GENERAL SURGERY NOTE    Subjective:  Patient states he is feeling a little better today.  Morning notes  reviewed.  Patient had a rapid response called on him following attempt at BM -developed some confusion and headache.  States he is feeling better now.  NG remains to suction.  He is passing a small amount of flatus.  He reports his abdominal bloating and pain has improved with NG.  Currently denying any nausea.  No fevers or chills.     Objective/Physical Exam:  General: Alert, orientated x3.  Cooperative.  No apparent distress.  Vital Signs:  Blood pressure 132/80, pulse 73, temperature 98.4 °F (36.9 °C), temperature source Oral, resp. rate 18, height 74\", weight 270 lb (122.5 kg), SpO2 96%.  Lungs: No respiratory distress.  Cardiac: Regular rate and rhythm.   Abdomen:  Soft, non distended, non tender, with no rebound or guarding.  No peritoneal signs.   Extremities:  No lower extremity edema noted.        Assessment      Patient Active Problem List   Diagnosis    SBO (small bowel obstruction) ([facility])    Hypokalemia    Primary hypertension    Acquired hypothyroidism    Type 2 diabetes mellitus without complication, without long-term current use of insulin (HCC)    Polyneuropathy associated with underlying disease (HCC)    Other headache syndrome         SBO     Plan:  Patient is passing some flatus, though will continue with NG to LIS until more reliable bowel function.  We discussed clamp trial once passing more gas or has a bowel movement  In the interim, continue NPO.  May have ice chips, gum, hard candy.  Analgesics and antiemetics as needed  Appreciate hospitalist recommendation in regards to episode of headache and confusion.  Improved now.  Possible vagal response while attempting BM.  Ambulate and up to chair  DVT prophylaxis with Lovenox  GI prophylaxis with Protonix

## 2024-12-10 NOTE — PLAN OF CARE
Alert and Oriented x4. On RA. VSS. Tele-NS. Denies pain at this time. Denies N/T. Voiding freely. Tolerating diet, on CLD currently trialing with NGT clamped. Denies N/V. Call light within reach at this time.    Plan: NGT clamp trial with CLD

## 2024-12-10 NOTE — PROGRESS NOTES
[facility]  Progress Note    Jd Chaves Patient Status:  Inpatient    1960 MRN [id number]   Location [facility] 3SW-A Attending Maki Matt MD   Hosp Day # 3 PCP RADHA REYES MD     Subjective:  The patient is resting comfortably in bed.  He denies abdominal pain.  Denies nausea or vomiting.  He reports flatus.  He denies a bowel movement.    Objective/Physical Exam:  General: Alert, orientated x3.  Cooperative.  No apparent distress.  Vital Signs:  Blood pressure 134/79, pulse 61, temperature 97.7 °F (36.5 °C), temperature source Oral, resp. rate 18, height 74\", weight 270 lb (122.5 kg), SpO2 97%.  Wt Readings from Last 3 Encounters:   24 270 lb (122.5 kg)     Lungs: No respiratory distress.  Cardiac: Regular rate and rhythm.   Abdomen:  Soft, non distended, non tender, with no rebound or guarding.  No peritoneal signs.   Extremities:  No lower extremity edema noted.        Intake/Output:    Intake/Output Summary (Last 24 hours) at 12/10/2024 1100  Last data filed at 2024 1730  Gross per 24 hour   Intake 900 ml   Output --   Net 900 ml     I/O last 3 completed shifts:  In: 900 [P.O.:900]  Out: 850 [Emesis/NG output:850]  No intake/output data recorded.    Medications:    potassium chloride  40 mEq Oral Once    levothyroxine  200 mcg Oral Before breakfast    busPIRone  5 mg Oral BID    gabapentin  600 mg Oral BID    [Held by provider] Mirabegron ER  50 mg Oral Daily    pantoprazole  40 mg Oral QAM AC    LORazepam  0.5 mg Intravenous Once    atenolol-chlorthalidone  1 tablet Oral Daily    DULoxetine  60 mg Oral BID    lisinopril  40 mg Oral Daily    enoxaparin  40 mg Subcutaneous Daily    polyethylene glycol (PEG 3350)  17 g Oral Daily       Labs:     Lab Results   Component Value Date    K 3.4 12/10/2024     Lab Results   Component Value Date    INR 1.08 2024         Assessment  Patient Active Problem List   Diagnosis    SBO (small bowel obstruction) (HCC)    Hypokalemia     Primary hypertension    Acquired hypothyroidism    Type 2 diabetes mellitus without complication, without long-term current use of insulin (HCC)    Polyneuropathy associated with underlying disease (HCC)    Other headache syndrome     Small bowel structuring, resolved  Obstructive series today without obstructive pattern      Plan:  Remove NG tube.  Start full liquid diet.  Add bowel regimen.  Advance to soft diet as tolerated.  If patient tolerates advancement diet, he is stable for discharge to home from surgical standpoint.  Follow-up with general surgery as needed.      Quality:  DVT Mechanical Prophylaxis:        DVT Pharmacologic Prophylaxis   Medication    enoxaparin (Lovenox) 40 MG/0.4ML SUBQ injection 40 mg                Code Status: Not on file  Mcnally: No urinary catheter in place  Mcnally Duration (in days):   Central line:    CRISTOBAL: 12/11/2024        Soniya Mclain PA-C  12/10/2024  11:00 AM

## 2024-12-10 NOTE — PLAN OF CARE
Pt tolerating soft diet, no nausea or emesis, passing gas, feeling comfortable no pain noted, would like to discharge home at this time.

## 2024-12-10 NOTE — PLAN OF CARE
ED admit 12/7 SBO, Pt is AAOX4, NGT to LIS, brownish red output, clamped for trial this morning, ABD X-ray complete, waiting on results, VSS, room air, IV SL, K+ replaced, NPO up SBA, headache seems to have resolved at this time, spouse at bedside aware of POC, all needs met, all safety measures in place, call light within reach, will CTM.     Update: NG removed and cleared for diet.

## 2024-12-11 ENCOUNTER — TELEPHONE (OUTPATIENT)
Age: 64
End: 2024-12-11

## 2024-12-11 NOTE — PAYOR COMM NOTE
--------------  DISCHARGE REVIEW    Payor: ALFREDO TOVAR [facility]  Subscriber #:  [id number]  Authorization Number: [id number]    Admit date: 12/7/24  Admit time:   4:47 AM  Discharge Date: 12/10/2024  6:00 PM     Admitting Physician: Mk Weaver DO  Attending Physician:  Haydee Coffey DO  Primary Care Physician: Adan Vega MD       Discharge Summary Notes    No notes of this type exist for this encounter.         REVIEWER COMMENTS    Pt tolerating soft diet, no nausea or emesis, passing gas, feeling comfortable no pain noted, would like to discharge home at this time.

## 2024-12-13 ENCOUNTER — TELEPHONE (OUTPATIENT)
Dept: WOUND CARE | Facility: HOSPITAL | Age: 64
End: 2024-12-13

## 2024-12-13 NOTE — TELEPHONE ENCOUNTER
States foot is not healed and still need an appt. States he is not able to retained a referral from PCP. States he switching insurance from HMO, to PPO in the beginning of the year, will call us back then.

## 2024-12-17 NOTE — DISCHARGE SUMMARY
[place] HOSPITALIST  DISCHARGE SUMMARY     Jd Chaves Patient Status:  Inpatient    1960 MRN [id number]   Location [facility] 3SW-A Attending Haydee Coffey, DO   Hosp Day # 3 PCP RADHA REYES MD     Date of Admission: 2024  Date of Discharge:  12/10/2024     Discharge Disposition: Home or Self Care    Discharge Diagnosis:  #SBO  NGT to LIS  Surgery on Cs  #Hypertension  PRN meds while NPO  #Migraine headache- intractable  MRI 24 head no acute findings  Migraine cocktail   CT head on admission no change  #Hypothyroid   #DM type 2 with PN   ISS    History of Present Illness:   Jd Chaves is a 64 year old male with history of hypertension, diabetes presents the emergency room with abdominal pain that started a few hours before coming to the emergency room.  Abdominal pain is diffuse throughout the abdomen but nonradiating.  Patient had some nausea but no vomiting.  Patient was bowel movement was 2 days ago which was normal.  No hematochezia, melena, hematuria.  No chest pain, shortness of breath         Brief Synopsis:   Pt admitted for SBO management- treated conservatively per surgery recommendations. Pt symptoms improved with NGT. Hospital stay c/b intractable migraine headache- no acute findings on CT head CTA head or MRI brain.   Pt DC home tolerating PO and migraine had resolved.     Lace+ Score: 43  59-90 High Risk  29-58 Medium Risk  0-28   Low Risk       TCM Follow-Up Recommendation:  LACE > 58: High Risk of readmission after discharge from the hospital.      Procedures during hospitalization:   MRI, CT head, CTA Head &N    Incidental or significant findings and recommendations (brief descriptions):  no    Lab/Test results pending at Discharge:   no    Consultants:  Surgery     Discharge Medication List:     Discharge Medications        CHANGE how you take these medications        Instructions Prescription details   buPROPion  MG Tb12  Commonly known as: WELLBUTRIN  SR  What changed: Another medication with the same name was removed. Continue taking this medication, and follow the directions you see here.      Take 1 tablet (100 mg total) by mouth daily.   Refills: 0            CONTINUE taking these medications        Instructions Prescription details   atenolol-chlorthalidone 100-25 MG Tabs  Commonly known as: TENORETIC      Take 1 tablet by mouth daily.   Refills: 0     atorvastatin 40 MG Tabs  Commonly known as: Lipitor      Take 1 tablet (40 mg total) by mouth daily.   Refills: 0     AZELASTINE & FLUTICASONE NA      2 sprays by Nasal route 2 (two) times daily.   Refills: 0     busPIRone 5 MG Tabs  Commonly known as: Buspar      Take 1 tablet (5 mg total) by mouth 2 (two) times daily.   Refills: 0     DULoxetine 60 MG Cpep  Commonly known as: Cymbalta      Take 1 capsule (60 mg total) by mouth 2 (two) times daily.   Refills: 0     gabapentin 600 MG Tabs  Commonly known as: Neurontin      Take 1 tablet (600 mg total) by mouth 2 (two) times daily.   Refills: 0     Jardiance 25 MG Tabs  Generic drug: empagliflozin      Take 1 tablet (25 mg total) by mouth daily.   Refills: 0     levothyroxine 200 MCG Tabs  Commonly known as: Synthroid      Take 1 tablet (200 mcg total) by mouth daily.   Refills: 0     lisinopril 40 MG Tabs  Commonly known as: Prinivil; Zestril      Take 1 tablet (40 mg total) by mouth daily.   Refills: 0     metFORMIN HCl 1000 MG Tabs  Commonly known as: GLUCOPHAGE      Take 1 tablet (1,000 mg total) by mouth 2 (two) times daily.   Refills: 0     Mounjaro 2.5 MG/0.5ML Soaj  Generic drug: Tirzepatide      Inject 2.5 mg as directed every 7 days.   Refills: 0     Myrbetriq 50 MG Tb24  Generic drug: Mirabegron ER      Take 1 tablet (50 mg total) by mouth daily.   Refills: 0     Omeprazole 40 MG Cpdr      Take 1 capsule (40 mg total) by mouth daily.   Refills: 0     Testosterone Cypionate 200 MG/ML Kit      Inject 1 mL into the muscle every 14 (fourteen) days.    Refills: 0     traZODone 50 MG Tabs  Commonly known as: Desyrel      Take 0.5 tablets (25 mg total) by mouth nightly as needed.   Refills: 0              [place] reviewed[place] n[place]     Follow-up appointment:   Adan Vega MD  [address]  [facility] [address]  [phone]    Schedule an appointment as soon as possible for a visit in 1 week(s)      Appointments for Next 30 Days 2024 - 1/15/2025      None            Vital signs:       Physical Exam:    General: No acute distress   Lungs: clear to auscultation  Cardiovascular: S1, S2  Abdomen: Soft      -----------------------------------------------------------------------------------------------  PATIENT DISCHARGE INSTRUCTIONS: See electronic chart    Maki Matt MD    Total time spent on discharge plannin minutes     The  [employer] Cures Act makes medical notes like these available to patients in the interest of transparency. Please be advised this is a medical document. Medical documents are intended to carry relevant information, facts as evident, and the clinical opinion of the practitioner. The medical note is intended as peer to peer communication and may appear blunt or direct. It is written in medical language and may contain abbreviations or verbiage that are unfamiliar.

## 2025-05-27 ENCOUNTER — HOSPITAL ENCOUNTER (OUTPATIENT)
Age: 65
Discharge: HOME | End: 2025-05-27
Payer: MEDICARE

## 2025-05-27 VITALS
DIASTOLIC BLOOD PRESSURE: 85 MMHG | SYSTOLIC BLOOD PRESSURE: 137 MMHG | RESPIRATION RATE: 17 BRPM | OXYGEN SATURATION: 100 % | HEART RATE: 59 BPM | TEMPERATURE: 98 F

## 2025-05-27 DIAGNOSIS — H10.32 ACUTE CONJUNCTIVITIS OF LEFT EYE, UNSPECIFIED ACUTE CONJUNCTIVITIS TYPE: Primary | ICD-10-CM

## 2025-05-27 PROCEDURE — 99203 OFFICE O/P NEW LOW 30 MIN: CPT | Performed by: PHYSICIAN ASSISTANT

## 2025-05-27 RX ORDER — ERYTHROMYCIN 5 MG/G
1 OINTMENT OPHTHALMIC EVERY 6 HOURS
Qty: 3.5 G | Refills: 0 | Status: SHIPPED | OUTPATIENT
Start: 2025-05-27 | End: 2025-06-03

## 2025-05-27 RX ORDER — TETRACAINE HYDROCHLORIDE 5 MG/ML
1 SOLUTION OPHTHALMIC ONCE
Status: COMPLETED | OUTPATIENT
Start: 2025-05-27 | End: 2025-05-27

## 2025-05-27 NOTE — DISCHARGE INSTRUCTIONS
[facility]VISE PROMPT EYE EVALUATION OF INTRAOCULAR PRESSURES THROUGH YOUR EYE DOCTOR OR MY REFERRAL TODAY VS GOING TO ER BY RECOMMENDATION.

## 2025-05-27 NOTE — ED PROVIDER NOTES
Chief Complaint   Patient presents with    Eye Problem     Entered by patient    Eye Pain       HPI:     Jd Chaves is a 64 year old male who presents history of hypertension diabetes presents for evaluation of left eye irritation redness and discharge over the last 2 days, notes preceding congestion sore throat last week improving without medical evaluation, sick contacts include grandchildren without associated eye issues resolving without medical management.  Patient notes saw ophthalmology through [facility] last month with concerns of possible early cataracts without glaucoma history noted subjectively.  Patient notes pain is described as irritation max pain is a 4 out of 10.  Denies associated headache dizziness vision loss sore throat dysphagia neck pain chest pain shortness of breath abdominal pain back pain upper or lower extremity weakness numbness or swelling.  Denies contact lens use.      PFSH    PFS asessment screens reviewed and agree.  Nurses notes reviewed I agree with documentation.    Family History[1]  Family history reviewed with patient/caregiver and is not pertinent to presenting problem.  Social History     Socioeconomic History    Marital status:      Spouse name: Not on file    Number of children: Not on file    Years of education: Not on file    Highest education level: Not on file   Occupational History    Not on file   Tobacco Use    Smoking status: Never     Passive exposure: Never    Smokeless tobacco: Never   Vaping Use    Vaping status: Never Used   Substance and Sexual Activity    Alcohol use: Never    Drug use: Never    Sexual activity: Not on file   Other Topics Concern    Not on file   Social History Narrative    Not on file     Social Drivers of Health     Food Insecurity: No Food Insecurity (12/7/2024)    Food Insecurity     Food Insecurity: Never true   Transportation Needs: No Transportation Needs (12/7/2024)    Transportation Needs     Lack of  Transportation: No     Car Seat: Not on file   Housing Stability: Low Risk  (12/7/2024)    Housing Stability     Housing Instability: No     Housing Instability Emergency: Not on file     Crib or Bassinette: Not on file         ROS:   Positive for stated complaint: Eye irritation.  All other systems reviewed and negative except as noted above.  Constitutional and Vital Signs Reviewed.      Physical Exam:     Findings:    /85   Pulse 59   Temp 97.5 °F (36.4 °C) (Oral)   Resp 17   SpO2 100%   GENERAL: well developed, well nourished, well hydrated, no distress  SKIN: good skin turgor, no obvious rashes  NECK: supple, no adenopathy  EXTREMITIES: no cyanosis or edema. SPANN without difficulty  GI: soft, non-tender, normal bowel sounds  HEAD: normocephalic, atraumatic  EYES: Vision 20/ 200 left eye, 20/30 right ; mild injected left conjunctive.  Mild discharge along the surrounding eyelid without associated blepharitis visualized hordeolum chemosis or proptosis.  No periorbital induration or fluctuance or sign of ocular entrapment.  Right sclera unremarkable.  Fluorescein stain to the left cornea without visualized foreign body abrasion or ulceration.  Limited funduscopic exam without visualized AVM papilledema or retinal detachment.  EARS: TMs clear bilaterally. Canals clear.  NOSE: No rhinorrhea MMM.  Nasal turbinates: pink, normal mucosa  THROAT: clear, without exudates, uvula midline, and airway patent  LUNGS: clear to auscultation bilaterally; no rales, rhonchi, or wheezes  NEURO: No focal deficits  PSYCH: Alert and oriented x3.  Answering questions appropriately.  Mood appropriate.    MDM/Assessment/Plan:   Orders for this encounter:    Orders Placed This Encounter    tetracaine (Pontocaine) 0.5 % ophthalmic solution 1 drop    fluorescein sodium (Ful-Radha) 1 MG ophthalmic strip 1 strip    erythromycin 5 MG/GM Ophthalmic Ointment     Sig: Place 1 Application into the left eye every 6 (six) hours for 7 days.      Dispense:  3.5 g     Refill:  0       Labs performed this visit:  No results found for this or any previous visit (from the past 10 hours).    MDM:  Patient instructed based on equipment error with our Casey-Pen not functioning, recommendations for more prompt evaluation today for potential acute angle glaucoma through ophthalmology versus emergency room.  Patient declining me contacting ophthalmology or ER designation; instead requesting empiric antibiotics based on discussion and will readdress promptly if worsening or ongoing symptoms today/overnight.  Patient understands potential risk of deferring further prompt assessment including morbidity mortality.  Dr. Gramajo was notified.    Diagnosis:    ICD-10-CM    1. Acute conjunctivitis of left eye, unspecified acute conjunctivitis type  H10.32           All results reviewed and discussed with patient.  See AVS for detailed discharge instructions for your condition today.    Follow Up with:  Deon Still MD  [address] [place] [id number]  [phone]    Call today  EYE REFERRAL VS ER BY RECOMMENDATION ASAP         [1] No family history on file.

## 2025-06-18 ENCOUNTER — HOSPITAL ENCOUNTER (OUTPATIENT)
Age: 65
Discharge: HOME | End: 2025-06-18
Payer: MEDICARE

## 2025-06-18 ENCOUNTER — APPOINTMENT (OUTPATIENT)
Dept: GENERAL RADIOLOGY | Age: 65
End: 2025-06-18
Attending: PHYSICIAN ASSISTANT
Payer: MEDICARE

## 2025-06-18 VITALS
RESPIRATION RATE: 18 BRPM | SYSTOLIC BLOOD PRESSURE: 109 MMHG | OXYGEN SATURATION: 97 % | DIASTOLIC BLOOD PRESSURE: 82 MMHG | TEMPERATURE: 98 F | HEART RATE: 72 BPM

## 2025-06-18 DIAGNOSIS — J01.40 ACUTE PANSINUSITIS, RECURRENCE NOT SPECIFIED: ICD-10-CM

## 2025-06-18 DIAGNOSIS — R09.82 POSTNASAL DRIP: ICD-10-CM

## 2025-06-18 DIAGNOSIS — R05.1 ACUTE COUGH: Primary | ICD-10-CM

## 2025-06-18 PROCEDURE — 99213 OFFICE O/P EST LOW 20 MIN: CPT | Performed by: PHYSICIAN ASSISTANT

## 2025-06-18 PROCEDURE — 71046 X-RAY EXAM CHEST 2 VIEWS: CPT | Performed by: PHYSICIAN ASSISTANT

## 2025-06-18 NOTE — ED INITIAL ASSESSMENT (HPI)
Cough, congestion, sore throat, sinus pain/pressure x 10 days. Productive, thick/yellow sputum.    Taking sudafed, mucinex, advil

## 2025-06-18 NOTE — ED PROVIDER NOTES
Patient Seen in: [facility]      History  Chief Complaint   Patient presents with    Cough/URI     Stated Complaint: cough    Subjective:   HPI         65 YO male presents to immediate care with below stated past medical history presents to immediate care with 10-day history of sinus pressure/pain, nasal congestion, postnasal drip, cough.  Patient reports close exposure to family members that are sick with similar symptoms and with pneumonia.  He denies fever/chills, SOB or chest pain.  Taking Sudafed with minimal relief.         Objective:     Past Medical History:    Bowel obstruction (HCC)    Diabetes (HCC)    Essential hypertension    Thyroid disease              History reviewed. No pertinent surgical history.             Social History     Socioeconomic History    Marital status:    Tobacco Use    Smoking status: Never     Passive exposure: Never    Smokeless tobacco: Never   Vaping Use    Vaping status: Never Used   Substance and Sexual Activity    Alcohol use: Never    Drug use: Never     Social Drivers of Health     Food Insecurity: No Food Insecurity (12/7/2024)    Food Insecurity     Food Insecurity: Never true   Transportation Needs: No Transportation Needs (12/7/2024)    Transportation Needs     Lack of Transportation: No   Housing Stability: Low Risk  (12/7/2024)    Housing Stability     Housing Instability: No              Review of Systems    Positive for stated complaint: cough  Other systems are as noted in HPI.  Constitutional and vital signs reviewed.      All other systems reviewed and negative except as noted above.      Physical Exam    ED Triage Vitals   BP 06/18/25 1228 109/82   Pulse 06/18/25 1225 72   Resp 06/18/25 1225 18   Temp 06/18/25 1225 98.1 °F (36.7 °C)   Temp src 06/18/25 1225 Oral   SpO2 06/18/25 1225 97 %   O2 Device 06/18/25 1225 None (Room air)       Current Vitals:   Vital Signs  BP: 109/82  Pulse: 72  Resp: 18  Temp: 98.1 °F (36.7 °C)  Temp src:  Oral    Oxygen Therapy  SpO2: 97 %  O2 Device: None (Room air)        Physical Exam  Vitals and nursing note reviewed.   Constitutional:       General: He is not in acute distress.     Appearance: Normal appearance. He is not ill-appearing, toxic-appearing or diaphoretic.   Cardiovascular:      Rate and Rhythm: Normal rate.      Heart sounds: Normal heart sounds.   Pulmonary:      Effort: Pulmonary effort is normal. No respiratory distress.      Breath sounds: Normal breath sounds. No wheezing.   Neurological:      Mental Status: He is alert and oriented to person, place, and time.   Psychiatric:         Behavior: Behavior normal.         ED Course  Labs Reviewed - No data to display  XR CHEST PA + LAT CHEST (GHF=16200)  Result Date: 6/18/2025  PROCEDURE:  XR CHEST PA + LAT CHEST (CPT=71046)  INDICATIONS:  cough  COMPARISON:  [facility] , XR, XR CHEST AP PORTABLE  (CPT=71045), 12/07/2024, 2:02 AM.  [facility] , XR, XR CHEST AP PORTABLE  (CPT=71045), 12/07/2024, 3:32 AM.  TECHNIQUE:  PA and lateral chest radiographs were obtained.  PATIENT STATED HISTORY: (As transcribed by Technologist)  Pt. c/o productive cough, congestion, and sore throat for 10 days.    FINDINGS:  Normal heart size and pulmonary vascularity.  No focal pulmonary opacity.  No pleural effusion.  Degenerative spurring of thoracic spine.            CONCLUSION:  No evidence of active cardiopulmonary disease.   LOCATION:  [facility]   Dictated by (CST): Michelet Lara MD on 6/18/2025 at 12:57 PM     Finalized by (CST): Michelet Lara MD on 6/18/2025 at 12:58 PM       I independently reviewed x-rays.  No focal consolidation.     MDM     Differential diagnosis considered but not limited to COVID, other viral illness, pneumonia, bacterial sinusitis    Afebrile and nontoxic in appearance.  Bilateral frontal and maxillary sinus tenderness.  Chest x-ray negative for acute process.  Considering duration of persistent sinusitis, will prescribe Augmentin to cover possible  bacterial component.  Return for worsening symptoms.      Medical Decision Making  Risk  Prescription drug management.        Disposition and Plan     Clinical Impression:  1. Acute cough    2. Acute pansinusitis, recurrence not specified    3. Postnasal drip         Disposition:  Discharge  6/18/2025  1:13 pm    Follow-up:  [facility] [address] [address]  [phone]    If symptoms worsen          Medications Prescribed:  Discharge Medication List as of 6/18/2025  1:13 PM        START taking these medications    Details   amoxicillin clavulanate 875-125 MG Oral Tab Take 1 tablet by mouth 2 (two) times daily for 7 days., Normal, Disp-14 tablet, R-0                   Supplementary Documentation: